# Patient Record
Sex: MALE | Race: WHITE | NOT HISPANIC OR LATINO | Employment: FULL TIME
[De-identification: names, ages, dates, MRNs, and addresses within clinical notes are randomized per-mention and may not be internally consistent; named-entity substitution may affect disease eponyms.]

---

## 2017-08-12 ENCOUNTER — HEALTH MAINTENANCE LETTER (OUTPATIENT)
Age: 57
End: 2017-08-12

## 2017-09-26 ENCOUNTER — OFFICE VISIT (OUTPATIENT)
Dept: FAMILY MEDICINE | Facility: CLINIC | Age: 57
End: 2017-09-26
Payer: COMMERCIAL

## 2017-09-26 VITALS
SYSTOLIC BLOOD PRESSURE: 136 MMHG | HEART RATE: 87 BPM | DIASTOLIC BLOOD PRESSURE: 82 MMHG | HEIGHT: 74 IN | OXYGEN SATURATION: 98 % | WEIGHT: 233 LBS | TEMPERATURE: 98.3 F | BODY MASS INDEX: 29.9 KG/M2

## 2017-09-26 DIAGNOSIS — Z91.09 ENVIRONMENTAL ALLERGIES: ICD-10-CM

## 2017-09-26 DIAGNOSIS — K21.9 GASTROESOPHAGEAL REFLUX DISEASE WITHOUT ESOPHAGITIS: ICD-10-CM

## 2017-09-26 DIAGNOSIS — J01.01 ACUTE RECURRENT MAXILLARY SINUSITIS: Primary | ICD-10-CM

## 2017-09-26 DIAGNOSIS — H93.13 TINNITUS, BILATERAL: ICD-10-CM

## 2017-09-26 PROCEDURE — 99213 OFFICE O/P EST LOW 20 MIN: CPT | Performed by: FAMILY MEDICINE

## 2017-09-26 RX ORDER — OMEPRAZOLE 40 MG/1
40 CAPSULE, DELAYED RELEASE ORAL DAILY
Qty: 90 CAPSULE | Refills: 1 | Status: SHIPPED | OUTPATIENT
Start: 2017-09-26 | End: 2017-12-20

## 2017-09-26 RX ORDER — FLUTICASONE PROPIONATE 50 MCG
1-2 SPRAY, SUSPENSION (ML) NASAL DAILY
Qty: 48 G | Refills: 3 | Status: SHIPPED | OUTPATIENT
Start: 2017-09-26 | End: 2017-12-20

## 2017-09-26 RX ORDER — AZITHROMYCIN 250 MG/1
TABLET, FILM COATED ORAL
Qty: 6 TABLET | Refills: 0 | Status: SHIPPED | OUTPATIENT
Start: 2017-09-26 | End: 2017-12-20

## 2017-09-26 NOTE — MR AVS SNAPSHOT
After Visit Summary   9/26/2017    Kashif Mcdonald    MRN: [id number]           Patient Information     Date Of Birth          1960        Visit Information        Provider Department      9/26/2017 12:40 PM Hong Leon MD [facility]        Today's Diagnoses     Acute recurrent maxillary sinusitis    -  1    Environmental allergies        Gastroesophageal reflux disease without esophagitis        Tinnitus, bilateral          Care Instructions    Refilled omeprazole and Flonase.    Prescribed Zithromax.    Follow up if not improving.       [facility]                        To reach your care team during and after hours:   [phone]  To reach our pharmacy:        [phone]    Clinic Hours                        Our clinic hours are:    Monday   7:30 am to 7:00 pm                  Tuesday through Friday 7:30 am to 5:00 pm                             Saturday   8:00 am to 12:00 pm      Sunday   Closed      Pharmacy Hours                        Our pharmacy hours are:    Monday   8:30 am to 7:00 pm       Tuesday to Friday  8:30 am to 6:00 pm                       Saturday    9:00 am to 1:00 pm              Sunday    Closed              There is also information available at our web site:  [email or web address]    If your provider ordered any lab tests and you do not receive the results within 10 business days, please call the clinic.    If you need a medication refill please contact your pharmacy.  Please allow 2-3 business days for your refill to be completed.    Our clinic offers telephone visits and e visits.  Please ask one of your team members to explain more.      Use NOBLE PEAK VISIONhart (secure email communication and access to your chart) to send your primary care provider a message or make an appointment. Ask someone on your Team how to sign up for NeuroSavet.  Immunizations                      Immunization History   Administered Date(s) Administered     HepB 10/18/1995,  "11/22/1995, 09/24/1996     Influenza (H1N1) 12/28/2009     Influenza (IIV3) 10/14/1999, 11/05/2007, 11/14/2008, 09/11/2009, 02/01/2013, 11/01/2013     Influenza Vaccine, 3 YRS +, IM (QUADRIVALENT W/PRESERVATIVES) 11/28/2014     TD (ADULT, 7+) 04/21/1997, 06/06/2004     Tdap (Adacel,Boostrix) 03/25/2012        Health Maintenance                         Health Maintenance Due   Topic Date Due     Hepatitis C Screening  12/08/1978     Cholesterol Lab - yearly  12/05/2014     Prostate Test (PSA) - yearly  12/05/2014     Colon Cancer Screening - FIT Test - yearly  12/13/2014     Wellness Visit with your Primary Provider - yearly  12/13/2014     Colonoscopy - every 5 years  01/27/2017     Flu Vaccine - yearly  09/01/2017               Follow-ups after your visit        Who to contact     If you have questions or need follow up information about today's clinic visit or your schedule please contact [facility] directly at [phone].  Normal or non-critical lab and imaging results will be communicated to you by Clupediahart, letter or phone within 4 business days after the clinic has received the results. If you do not hear from us within 7 days, please contact the clinic through MobileGlobet or phone. If you have a critical or abnormal lab result, we will notify you by phone as soon as possible.  Submit refill requests through Mobile Theory or call your pharmacy and they will forward the refill request to us. Please allow 3 business days for your refill to be completed.          Additional Information About Your Visit        Mobile Theory Information     Mobile Theory lets you send messages to your doctor, view your test results, renew your prescriptions, schedule appointments and more. To sign up, go to [email or web address] . Click on \"Log in\" on the left side of the screen, which will take you to the Welcome page. Then click on \"Sign up Now\" on the right side of the page.     You will be asked to enter the access code listed " "below, as well as some personal information. Please follow the directions to create your username and password.     Your access code is: [email or web address]  Expires: 2017  1:14 PM     Your access code will  in 90 days. If you need help or a new code, please call your [facility] clinic or [phone].        Care EveryWhere ID     This is your Care EveryWhere ID. This could be used by other organizations to access your [facility] medical records  [id number]        Your Vitals Were     Pulse Temperature Height Pulse Oximetry BMI (Body Mass Index)       87 98.3  F (36.8  C) (Oral) 6' 2\" (1.88 m) 98% 29.92 kg/m2        Blood Pressure from Last 3 Encounters:   17 136/82   16 140/88   16 120/80    Weight from Last 3 Encounters:   17 233 lb (105.7 kg)   16 248 lb (112.5 kg)   16 242 lb 4.8 oz (109.9 kg)              Today, you had the following     No orders found for display         Today's Medication Changes          These changes are accurate as of: 17  1:14 PM.  If you have any questions, ask your nurse or doctor.               Start taking these medicines.        Dose/Directions    azithromycin 250 MG tablet   Commonly known as:  ZITHROMAX   Used for:  Acute recurrent maxillary sinusitis   Started by:  Hong Leon MD        2 tabs day 1 and the 1 tab daily for 4 more days   Quantity:  6 tablet   Refills:  0            Where to get your medicines      These medications were sent to [facility] [address] [address]     Phone:  [phone]     azithromycin 250 MG tablet    fluticasone 50 MCG/ACT spray    omeprazole 40 MG capsule                Primary Care Provider Office Phone # Fax #    Hong Leon [phone]       [address] [place] [address]        Equal Access to Services     [facility] AH: Lashay Carter, val hammeradamartha, qaybta " lisseth elizabeth haychristian bassruchi kwan. [facility] [phone].    ATENCIÓN: Si pita brasher, tiene a myers disposición servicios gratuitos de asistencia lingüística. Stefany al [phone].    We comply with applicable federal civil rights laws and [place] laws. We do not discriminate on the basis of race, color, national origin, age, disability sex, sexual orientation or gender identity.            Thank you!     Thank you for choosing [facility]  for your care. Our goal is always to provide you with excellent care. Hearing back from our patients is one way we can continue to improve our services. Please take a few minutes to complete the written survey that you may receive in the mail after your visit with us. Thank you!             Your Updated Medication List - Protect others around you: Learn how to safely use, store and throw away your medicines at www.disposemymeds.org.          This list is accurate as of: 9/26/17  1:14 PM.  Always use your most recent med list.                   Brand Name Dispense Instructions for use Diagnosis    azithromycin 250 MG tablet    ZITHROMAX    6 tablet    2 tabs day 1 and the 1 tab daily for 4 more days    Acute recurrent maxillary sinusitis       fluticasone 50 MCG/ACT spray    FLONASE    48 g    Spray 1-2 sprays into both nostrils daily    Acute recurrent maxillary sinusitis, Environmental allergies       omeprazole 40 MG capsule    priLOSEC    90 capsule    Take 1 capsule (40 mg) by mouth daily Take 30-60 minutes before a meal.    Gastroesophageal reflux disease without esophagitis

## 2017-09-26 NOTE — NURSING NOTE
"Chief Complaint   Patient presents with     Sinus Problem       Initial /82  Pulse 87  Temp 98.3  F (36.8  C) (Oral)  Ht 6' 2\" (1.88 m)  Wt 233 lb (105.7 kg)  SpO2 98%  BMI 29.92 kg/m2 Estimated body mass index is 29.92 kg/(m^2) as calculated from the following:    Height as of this encounter: 6' 2\" (1.88 m).    Weight as of this encounter: 233 lb (105.7 kg)..  BP completed using cuff size: dionte Gayle MA  "

## 2017-09-26 NOTE — PATIENT INSTRUCTIONS
Refilled omeprazole and Flonase.    Prescribed Zithromax.    Follow up if not improving.       [facility]                        To reach your care team during and after hours:   [phone]  To reach our pharmacy:        [phone]    Clinic Hours                        Our clinic hours are:    Monday   7:30 am to 7:00 pm                  Tuesday through Friday 7:30 am to 5:00 pm                             Saturday   8:00 am to 12:00 pm      Sunday   Closed      Pharmacy Hours                        Our pharmacy hours are:    Monday   8:30 am to 7:00 pm       Tuesday to Friday  8:30 am to 6:00 pm                       Saturday    9:00 am to 1:00 pm              Sunday    Closed              There is also information available at our web site:  [email or web address]    If your provider ordered any lab tests and you do not receive the results within 10 business days, please call the clinic.    If you need a medication refill please contact your pharmacy.  Please allow 2-3 business days for your refill to be completed.    Our clinic offers telephone visits and e visits.  Please ask one of your team members to explain more.      Use [email or web address] (Droppy) (secure email communication and access to your chart) to send your primary care provider a message or make an appointment. Ask someone on your Team how to sign up for [email or web address] (Droppy).  Immunizations                      Immunization History   Administered Date(s) Administered     HepB 10/18/1995, 11/22/1995, 09/24/1996     Influenza (H1N1) 12/28/2009     Influenza (IIV3) 10/14/1999, 11/05/2007, 11/14/2008, 09/11/2009, 02/01/2013, 11/01/2013     Influenza Vaccine, 3 YRS +, IM (QUADRIVALENT W/PRESERVATIVES) 11/28/2014     TD (ADULT, 7+) 04/21/1997, 06/06/2004     Tdap (Adacel,Boostrix) 03/25/2012        Health Maintenance                         Health Maintenance Due   Topic Date Due     Hepatitis C Screening  12/08/1978     Cholesterol Lab - yearly  12/05/2014     Prostate  Test (PSA) - yearly  12/05/2014     Colon Cancer Screening - FIT Test - yearly  12/13/2014     Wellness Visit with your Primary Provider - yearly  12/13/2014     Colonoscopy - every 5 years  01/27/2017     Flu Vaccine - yearly  09/01/2017

## 2017-09-26 NOTE — PROGRESS NOTES
SUBJECTIVE:   Kashif Mcdonald is a 56 year old male who presents to clinic today for the following health issues:    Kashif reports that his symptoms began about 4 days ago. He notes headache - left sided, sinus pressure - maxillary, upper teeth pain, ringing in ears, rhinorrhea with clear mucus, nausea, and post-nasal drip. Has been using saline sinus rinse and ibuprofen.    Kashif denies fever, sore throat, cough, vomiting, and diarrhea.    GERD -- has been using Pepcid Complete, believes omeprazole worked better.     Seasonal allergies -- controlled with Flonase, had a few weeks of symptoms this summer.     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  Additional history: as documented    Reviewed and updated as needed this visit by clinical staff  Tobacco  Allergies  Meds  Med Hx  Surg Hx  Fam Hx  Soc Hx      Reviewed and updated as needed this visit by Provider       BP Readings from Last 3 Encounters:   09/26/17 136/82   03/31/16 140/88   03/18/16 120/80       Wt Readings from Last 4 Encounters:   09/26/17 233 lb (105.7 kg)   03/31/16 248 lb (112.5 kg)   03/18/16 242 lb 4.8 oz (109.9 kg)   01/08/16 244 lb (110.7 kg)       Health Maintenance    Health Maintenance Due   Topic Date Due     HEPATITIS C SCREENING  12/08/1978     LIPID MONITORING Q1 YEAR  12/05/2014     PSA Q1 YR  12/05/2014     FIT Q1 YR  12/13/2014     WELLNESS VISIT Q1 YR  12/13/2014      COLONOSCOPY Q5 YR  01/27/2017     INFLUENZA VACCINE (SYSTEM ASSIGNED)  09/01/2017       Current Problem List    Patient Active Problem List   Diagnosis     Seasonal allergies     GERD (gastroesophageal reflux disease)     CARDIOVASCULAR SCREENING; LDL GOAL LESS THAN 130     Colon polyps     Advanced directives, counseling/discussion       Past Medical History    Past Medical History:   Diagnosis Date     Colocutaneous fistula      Colon polyps 1/12    adenomas x 3     GERD (gastroesophageal reflux disease)      Other specified disorders of eye and adnexa 1971    choroidal rupture - dr jain     Seasonal allergies     spring     Unspecified disorder of thyroid 2005    multinodular goiter - dr herrera/andrisevic       Past Surgical History    Past Surgical History:   Procedure Laterality Date     BIOPSY  2005    goiter - Dr Herrera     COLONOSCOPY  1/12    adenomatous polyps - due 2015     SURGICAL HISTORY OF -   1997    RT SHOULDER REPAIR     SURGICAL HISTORY OF -   1981    WISDOM TEETH       Current Medications    Current Outpatient Prescriptions   Medication Sig Dispense Refill     fluticasone (FLONASE) 50 MCG/ACT spray Spray 1-2 sprays into both nostrils daily 48 g 3     omeprazole (PRILOSEC) 40 MG capsule Take 1 capsule (40 mg) by mouth daily Take 30-60 minutes before a meal. 90 capsule 1     azithromycin (ZITHROMAX) 250 MG tablet 2 tabs day 1 and the 1 tab daily for 4 more days 6 tablet 0       Allergies    No Known Allergies    Immunizations    Immunization History   Administered Date(s) Administered     HepB 10/18/1995, 11/22/1995, 09/24/1996     Influenza (H1N1) 12/28/2009     Influenza (IIV3) 10/14/1999, 11/05/2007, 11/14/2008, 09/11/2009, 02/01/2013, 11/01/2013     Influenza Vaccine, 3 YRS +, IM (QUADRIVALENT W/PRESERVATIVES) 11/28/2014     TD (ADULT, 7+) 04/21/1997, 06/06/2004     Tdap (Adacel,Boostrix) 03/25/2012       Family History    Family History   Problem Relation Age of Onset     CANCER Father   "    age 56 - lymphoma       Social History    Social History     Social History     Marital status:      Spouse name: Esther     Number of children: 2     Years of education: 14     Occupational History      [employer]     Social History Main Topics     Smoking status: Former Smoker     Packs/day: 0.25     Years: 25.00     Types: Cigarettes     Quit date: 7/8/2005     Smokeless tobacco: Never Used     Alcohol use 0.0 - 0.5 oz/week     0 - 1 Standard drinks or equivalent per week      Comment: 0-3 drinks a month     Drug use: No     Sexual activity: Yes     Partners: Female     Birth control/ protection: Condom     Other Topics Concern     Caffeine Concern Yes     2 cups qd     Exercise Yes     work, limited by knees     Seat Belt Yes     Parent/Sibling W/ Cabg, Mi Or Angioplasty Before 65f 55m? No     Social History Narrative       All above reviewed and updated, all stable unless otherwise noted    Recent labs reviewed    ROS:  10 point ROS of systems including Constitutional, Eyes, Respiratory, Cardiovascular, Gastroenterology, Genitourinary, Integumentary, Muscularskeletal, Psychiatric were all negative except for pertinent positives noted in my HPI.    OBJECTIVE:                                                    /82  Pulse 87  Temp 98.3  F (36.8  C) (Oral)  Ht 6' 2\" (1.88 m)  Wt 233 lb (105.7 kg)  SpO2 98%  BMI 29.92 kg/m2  Body mass index is 29.92 kg/(m^2).  GENERAL: healthy, alert and no distress  EYES: Eyes grossly normal to inspection, extraocular movements - intact, and PERRL  HENT: ear canals and TM's normal upon viewing with otoscope, nose and mouth without ulcers or lesions upon viewing with otoscope - mild erythema of throat  RESP: lungs clear to auscultation - no rales, no rhonchi, no wheezes  CV: regular rates and rhythm, normal S1 S2, no S3 or S4 and no murmur, no click or rub -  ABDOMEN: soft, no tenderness, no  hepatosplenomegaly, no masses, normal bowel sounds  MS: extremities- no " gross deformities noted, no edema  SKIN: no suspicious lesions, no rashes  NEURO: mentation intact and speech normal  BACK: no CVA tenderness, no paralumbar tenderness  PSYCH: Alert and oriented times 3; speech- coherent , normal rate and volume; able to articulate logical thoughts, able to abstract reason, no tangential thoughts, no hallucinations or delusions, affect- normal    DIAGNOSTICS/PROCEDURES:                                                      No results found for this or any previous visit (from the past 24 hour(s)).     ASSESSMENT/PLAN:                                                        ICD-10-CM    1. Acute recurrent maxillary sinusitis J01.01 fluticasone (FLONASE) 50 MCG/ACT spray     azithromycin (ZITHROMAX) 250 MG tablet   2. Environmental allergies Z91.09 fluticasone (FLONASE) 50 MCG/ACT spray   3. Gastroesophageal reflux disease without esophagitis K21.9 omeprazole (PRILOSEC) 40 MG capsule   4. Tinnitus, bilateral H93.13      Discussed treatment/modality options, including risk and benefits, he desires follow up for fasting complete physical, further health care maintenance, medication refill(omeprazole, flonase), new medication(Zithromax), OTC meds(ibuprofen) and observation. All diagnosis above reviewed and noted above, otherwise stable.  See [facility] orders for further details.  Follow up as needed.    Refilled omeprazole and flonase.     Prescribed Zithromax.     Follow up if not improving.     OTC ibuprofen as needed.     Sx cares.     Follow up for complete fasting physical.     Health Maintenance Due   Topic Date Due     HEPATITIS C SCREENING  12/08/1978     LIPID MONITORING Q1 YEAR  12/05/2014     PSA Q1 YR  12/05/2014     FIT Q1 YR  12/13/2014     WELLNESS VISIT Q1 YR  12/13/2014     COLONOSCOPY Q5 YR  01/27/2017     INFLUENZA VACCINE (SYSTEM ASSIGNED)  09/01/2017     See Patient Instructions    This document serves as a record of the services and decisions personally performed and  made by [facility] MD [facility]. It was created on their behalf by Sandra Greenwood, a trained medical scribe. The creation of this document is based the provider's statements to the medical scribe. Sandra Greenwood September 26, 2017 12:58 PM              Hong Leon MD [address]  [address] [phone] Fax

## 2017-12-20 ENCOUNTER — OFFICE VISIT (OUTPATIENT)
Dept: FAMILY MEDICINE | Facility: CLINIC | Age: 57
End: 2017-12-20
Payer: COMMERCIAL

## 2017-12-20 VITALS
BODY MASS INDEX: 29.65 KG/M2 | HEART RATE: 80 BPM | TEMPERATURE: 98 F | OXYGEN SATURATION: 98 % | RESPIRATION RATE: 12 BRPM | HEIGHT: 74 IN | DIASTOLIC BLOOD PRESSURE: 74 MMHG | SYSTOLIC BLOOD PRESSURE: 120 MMHG | WEIGHT: 231 LBS

## 2017-12-20 DIAGNOSIS — Z91.09 ENVIRONMENTAL ALLERGIES: ICD-10-CM

## 2017-12-20 DIAGNOSIS — J01.01 ACUTE RECURRENT MAXILLARY SINUSITIS: Primary | ICD-10-CM

## 2017-12-20 DIAGNOSIS — K21.9 GASTROESOPHAGEAL REFLUX DISEASE WITHOUT ESOPHAGITIS: ICD-10-CM

## 2017-12-20 DIAGNOSIS — R21 RASH AND NONSPECIFIC SKIN ERUPTION: ICD-10-CM

## 2017-12-20 LAB
KOH PREP SPEC: NORMAL
SPECIMEN SOURCE: NORMAL

## 2017-12-20 PROCEDURE — 99214 OFFICE O/P EST MOD 30 MIN: CPT | Performed by: PHYSICIAN ASSISTANT

## 2017-12-20 PROCEDURE — 87220 TISSUE EXAM FOR FUNGI: CPT | Performed by: PHYSICIAN ASSISTANT

## 2017-12-20 RX ORDER — OMEPRAZOLE 40 MG/1
40 CAPSULE, DELAYED RELEASE ORAL DAILY
Qty: 90 CAPSULE | Refills: 1 | Status: SHIPPED | OUTPATIENT
Start: 2017-12-20 | End: 2020-04-20

## 2017-12-20 RX ORDER — AZITHROMYCIN 250 MG/1
TABLET, FILM COATED ORAL
Qty: 6 TABLET | Refills: 0 | Status: SHIPPED | OUTPATIENT
Start: 2017-12-20 | End: 2018-02-08

## 2017-12-20 RX ORDER — FLUTICASONE PROPIONATE 50 MCG
1-2 SPRAY, SUSPENSION (ML) NASAL DAILY
Qty: 48 G | Refills: 3 | Status: SHIPPED | OUTPATIENT
Start: 2017-12-20 | End: 2019-08-14

## 2017-12-20 NOTE — MR AVS SNAPSHOT
"              After Visit Summary   2017    Kashif Mcdonald    MRN: [id number]           Patient Information     Date Of Birth          1960        Visit Information        Provider Department      2017 9:20 AM Ngoc Mcgregor PA-C [facility]        Today's Diagnoses     Acute recurrent maxillary sinusitis    -  1    Rash and nonspecific skin eruption        Environmental allergies        Gastroesophageal reflux disease without esophagitis           Follow-ups after your visit        Who to contact     If you have questions or need follow up information about today's clinic visit or your schedule please contact [facility] directly at [phone].  Normal or non-critical lab and imaging results will be communicated to you by MyChart, letter or phone within 4 business days after the clinic has received the results. If you do not hear from us within 7 days, please contact the clinic through FRAMEDhart or phone. If you have a critical or abnormal lab result, we will notify you by phone as soon as possible.  Submit refill requests through CDNlion or call your pharmacy and they will forward the refill request to us. Please allow 3 business days for your refill to be completed.          Additional Information About Your Visit        MyChart Information     CDNlion lets you send messages to your doctor, view your test results, renew your prescriptions, schedule appointments and more. To sign up, go to [email or web address] . Click on \"Log in\" on the left side of the screen, which will take you to the Welcome page. Then click on \"Sign up Now\" on the right side of the page.     You will be asked to enter the access code listed below, as well as some personal information. Please follow the directions to create your username and password.     Your access code is: [email or web address]  Expires: 2017 12:14 PM     Your access code will  in 90 days. If you need help or a new " "code, please call your [facility] clinic or [phone].        Care EveryWhere ID     This is your Care EveryWhere ID. This could be used by other organizations to access your [facility] medical records  [id number]        Your Vitals Were     Pulse Temperature Respirations Height Pulse Oximetry BMI (Body Mass Index)    80 98  F (36.7  C) (Tympanic) 12 6' 2\" (1.88 m) 98% 29.66 kg/m2       Blood Pressure from Last 3 Encounters:   12/20/17 120/74   09/26/17 136/82   03/31/16 140/88    Weight from Last 3 Encounters:   12/20/17 231 lb (104.8 kg)   09/26/17 233 lb (105.7 kg)   03/31/16 248 lb (112.5 kg)              We Performed the Following     KOH prep (skin, hair or nails only)          Today's Medication Changes          These changes are accurate as of: 12/20/17 11:59 PM.  If you have any questions, ask your nurse or doctor.               Start taking these medicines.        Dose/Directions    azithromycin 250 MG tablet   Commonly known as:  ZITHROMAX   Used for:  Acute recurrent maxillary sinusitis   Started by:  Ngoc Mcgregor PA-C        Two tablets first day, then one tablet daily for four days.   Quantity:  6 tablet   Refills:  0            Where to get your medicines      These medications were sent to [facility] [address] [address]     Phone:  [phone]     azithromycin 250 MG tablet    fluticasone 50 MCG/ACT spray    omeprazole 40 MG capsule                Primary Care Provider Office Phone # Fax #    Hong Leon [phone]       [address] [place] [address]        Equal Access to Services     [facility] AH: Lashay Carter, waummda luqadaha, qaybta kaalmada chapis, lisseth kwan. So [facility] [phone].    ATENCIÓN: Si habla español, tiene a myers disposición servicios gratuitos de asistencia lingüística. Llame al [phone].    We comply " with applicable federal civil rights laws and [place] laws. We do not discriminate on the basis of race, color, national origin, age, disability, sex, sexual orientation, or gender identity.            Thank you!     Thank you for choosing [facility]  for your care. Our goal is always to provide you with excellent care. Hearing back from our patients is one way we can continue to improve our services. Please take a few minutes to complete the written survey that you may receive in the mail after your visit with us. Thank you!             Your Updated Medication List - Protect others around you: Learn how to safely use, store and throw away your medicines at www.disposemymeds.org.          This list is accurate as of: 12/20/17 11:59 PM.  Always use your most recent med list.                   Brand Name Dispense Instructions for use Diagnosis    azithromycin 250 MG tablet    ZITHROMAX    6 tablet    Two tablets first day, then one tablet daily for four days.    Acute recurrent maxillary sinusitis       fluticasone 50 MCG/ACT spray    FLONASE    48 g    Spray 1-2 sprays into both nostrils daily    Acute recurrent maxillary sinusitis, Environmental allergies       omeprazole 40 MG capsule    priLOSEC    90 capsule    Take 1 capsule (40 mg) by mouth daily Take 30-60 minutes before a meal.    Gastroesophageal reflux disease without esophagitis

## 2017-12-20 NOTE — PROGRESS NOTES
"  SUBJECTIVE:                                                    Kashif Mcdonald is a 57 year old male who presents to clinic today for the following health issues:      Acute Illness   Acute illness concerns: Sinus Issues. Hx of Seasonal and Environmental  allergies  Onset: x 1 week    Fever: no    Chills/Sweats: no    Headache (location?): Yes    Sinus Pressure:YES    Conjunctivitis:  YES: bilateral    Ear Pain: no    Rhinorrhea: YES    Congestion: YES    Sore Throat: no     Cough: no    Wheeze: no    Decreased Appetite: no    Nausea: no    Vomiting: no    Diarrhea:  no    Dysuria/Freq.: no    Fatigue/Achiness: no    Sick/Strep Exposure: papers in lower lever     Therapies Tried and outcome: Water rinse      Patient reports that last Thursday he was in a room at work that generally \"gets to him\" and now he thinks he has a sinus infection.      Patient reports that he has face pain and a headache.  He also reports increased ringing in his ears.  He also has some pain across his top two teeth.        He does have some nasal congestion.  He is doing the netti pot since last Thursday, but it does not feel like enough.      He has had a minimal cough, but that seems better today.  He denies shortness of breath or wheezing.      He also reports that he has an itchy lesion on his leg that he wants tested for ring worm.  He says it has been present for the past month.      Problem list and histories reviewed & adjusted, as indicated.  Additional history: as documented      ROS:  Constitutional, HEENT, cardiovascular, pulmonary, GI, , musculoskeletal, neuro, skin, endocrine and psych systems are negative, except as otherwise noted.    OBJECTIVE:                                                    /74  Pulse 80  Temp 98  F (36.7  C) (Tympanic)  Resp 12  Ht 6' 2\" (1.88 m)  Wt 231 lb (104.8 kg)  SpO2 98%  BMI 29.66 kg/m2  Body mass index is 29.66 kg/(m^2).  GENERAL: healthy, alert and no distress  EYES: Eyes " "grossly normal to inspection, PERRL and conjunctivae and sclerae normal  HENT: ear canals and TM's normal, nose and mouth without ulcers or lesions  NECK: no adenopathy, no asymmetry, masses, or scars and thyroid normal to palpation  RESP: lungs clear to auscultation - no rales, rhonchi or wheezes  CV: regular rate and rhythm, normal S1 S2, no S3 or S4, no murmur, click or rub, no peripheral edema and peripheral pulses strong  MS: no gross musculoskeletal defects noted, no edema  SKIN: Oval appearing skin rash on anterior left calf that is scaly, no drainage noted, no suspicious lesions or rashes  NEURO: Normal strength and tone, mentation intact and speech normal  PSYCH: mentation appears normal, affect normal/bright    Diagnostic Test Results:  KOH - Negative     ASSESSMENT/PLAN:                                                      Kashif was seen today for sinus problem.    Diagnoses and all orders for this visit:    Acute recurrent maxillary sinusitis  -     fluticasone (FLONASE) 50 MCG/ACT spray; Spray 1-2 sprays into both nostrils daily  -     azithromycin (ZITHROMAX) 250 MG tablet; Two tablets first day, then one tablet daily for four days.    Rash and nonspecific skin eruption  -     KOH prep (skin, hair or nails only)    Environmental allergies  -     fluticasone (FLONASE) 50 MCG/ACT spray; Spray 1-2 sprays into both nostrils daily    Gastroesophageal reflux disease without esophagitis  -     omeprazole (PRILOSEC) 40 MG capsule; Take 1 capsule (40 mg) by mouth daily Take 30-60 minutes before a meal.      - Treatment provided for sinusitis.    - Education given to patient today as it appears that he has francisco treated 4 times in the last year for sinusitis.  Avoiding aggravating triggers and wearing protective wear at work is strongly advised.    - Patient reports that he works in [place] and that we don't understand.  He says that he is in a melissa environment with archived paper.  I advised " that he wear a mask, he reports to having one, but does not want to wear it when the other men do not wear their's.    - Consideration of sinus CT scan or referral to ENT if he develops another sinus infection.      - KOH is negative.  Lesion on leg may be more dry,eczema appearing.  Patient encouraged to try hydrocortisone cream and followup if it is not improving.     - Medication for GERD renewed today as well.      I have discussed the patient's diagnosis, and my plan of treatment with the patient and/or family. Patient is aware to followup if symptoms do not improve.  Patient has been advised to be seen sooner or seek more immediate care if symptoms change or worsen.  Patient agrees with and understands the plan today.   See Patient Instructions        Ngoc Mcgregor PA-C    [facility] [place]

## 2017-12-20 NOTE — NURSING NOTE
"Chief Complaint   Patient presents with     Sinus Problem       Initial /74  Pulse 80  Temp 98  F (36.7  C) (Tympanic)  Resp 12  Ht 6' 2\" (1.88 m)  Wt 231 lb (104.8 kg)  SpO2 98%  BMI 29.66 kg/m2 Estimated body mass index is 29.66 kg/(m^2) as calculated from the following:    Height as of this encounter: 6' 2\" (1.88 m).    Weight as of this encounter: 231 lb (104.8 kg).  Medication Reconciliation: complete   Iris Russell LPN    "

## 2018-02-08 ENCOUNTER — OFFICE VISIT (OUTPATIENT)
Dept: FAMILY MEDICINE | Facility: CLINIC | Age: 58
End: 2018-02-08
Payer: COMMERCIAL

## 2018-02-08 VITALS
TEMPERATURE: 98.1 F | HEIGHT: 74 IN | DIASTOLIC BLOOD PRESSURE: 84 MMHG | OXYGEN SATURATION: 97 % | BODY MASS INDEX: 30.42 KG/M2 | SYSTOLIC BLOOD PRESSURE: 119 MMHG | HEART RATE: 96 BPM | WEIGHT: 237 LBS

## 2018-02-08 DIAGNOSIS — L30.9 DERMATITIS: Primary | ICD-10-CM

## 2018-02-08 PROCEDURE — 99213 OFFICE O/P EST LOW 20 MIN: CPT | Performed by: FAMILY MEDICINE

## 2018-02-08 RX ORDER — HYDROCORTISONE VALERATE 2 MG/G
OINTMENT TOPICAL
Qty: 15 G | Refills: 0 | Status: SHIPPED | OUTPATIENT
Start: 2018-02-08 | End: 2019-07-12

## 2018-02-08 NOTE — NURSING NOTE
"Chief Complaint   Patient presents with     Derm Problem       Initial /84  Pulse 96  Temp 98.1  F (36.7  C) (Oral)  Ht 6' 2\" (1.88 m)  Wt 237 lb (107.5 kg)  SpO2 97%  BMI 30.43 kg/m2 Estimated body mass index is 30.43 kg/(m^2) as calculated from the following:    Height as of this encounter: 6' 2\" (1.88 m).    Weight as of this encounter: 237 lb (107.5 kg).  Medication Reconciliation: complete   Maryellen Centeno Certified Medical Assistant    "

## 2018-02-08 NOTE — PROGRESS NOTES
SUBJECTIVE:   Kashif Mcdonald is a 57 year old male who presents to clinic today for the following health issues:      Rash  Onset: 3 Months     Description:   Location: Left shin   Character: round, blotchy, flakey, painful  Itching (Pruritis): YES- a lot     Progression of Symptoms:  worsening    Accompanying Signs & Symptoms:  Fever: no   Body aches or joint pain: no   Sore throat symptoms: no   Recent cold symptoms: YES- December seen in clinic     History:   Previous similar rash: no - trauma to leg from rotor tiller     Precipitating factors:   Exposure to similar rash: no   New exposures: was in a tunnel and foot got wet with slime, few days before cat scraped that leg    Recent travel: no     Alleviating factors:none     Therapies Tried and outcome: OTC Cortizone and gold bond - not helping       Problem list and histories reviewed & adjusted, as indicated.  Additional history: as documented    Patient Active Problem List   Diagnosis     Seasonal allergies     GERD (gastroesophageal reflux disease)     CARDIOVASCULAR SCREENING; LDL GOAL LESS THAN 130     Colon polyps     Advanced directives, counseling/discussion     Environmental allergies     Past Surgical History:   Pro[facility] Laterality Date     [facility]     COLONOSCOPY  1/12    adenomatous polyps - due 2015     SURGICAL HISTORY OF -   1997    RT SHOULDER REPAIR     SURGICAL HISTORY OF -   1981    WISDOM TEETH       Social History   Substance Use Topics     Smoking status: Former Smoker     Packs/day: 0.25     Years: 25.00     Types: Cigarettes     Quit date: 7/8/2005     Smokeless tobacco: Never Used     Alcohol use 0.0 - 0.5 oz/week     0 - 1 Standard drinks or equivalent per week      Comment: 0-3 drinks a month     Family History   Problem Relation Age of Onset     CANCER Father      age 56 - lymphoma           Reviewed and updated as needed this visit by clinical staff  Tobacco  Allergies  Meds  Problems  Med Hx  Surg  "Hx  Fam Hx  Soc Hx        Reviewed and updated as needed this visit by Provider  Allergies  Meds  Problems         ROS:  Constitutional, HEENT, cardiovascular, pulmonary, gi and gu systems are negative, except as otherwise noted.    OBJECTIVE:     /84  Pulse 96  Temp 98.1  F (36.7  C) (Oral)  Ht 6' 2\" (1.88 m)  Wt 237 lb (107.5 kg)  SpO2 97%  BMI 30.43 kg/m2  Body mass index is 30.43 kg/(m^2).  GENERAL: healthy, alert and no distress  SKIN: scarring over left leg, small erythematous rough patch on anterior left shin    Diagnostic Test Results:  none     ASSESSMENT/PLAN:   1. Dermatitis: unclear etiology -- ?eczema. No improvement with OTC steroid. Will try stronger potency - West-Johny 0.2% ointment TID x 14 days. If rash not improving with this, he will let me know and will send to dermatology for further evaluation.  - hydrocortisone valerate (WEST-JOHNY) 0.2 % ointment; Apply sparingly to affected area three times daily for 14 days.  Dispense: 15 g; Refill: 0    Edward Lucio, DO  [facility] [place]  "

## 2018-02-08 NOTE — MR AVS SNAPSHOT
"              After Visit Summary   2018    Kashif Mcdonald    MRN: [id number]           Patient Information     Date Of Birth          1960        Visit Information        Provider Department      2018 2:40 PM Edward Lucio, DO [facility] [place]        Today's Diagnoses     Dermatitis    -  1       Follow-ups after your visit        Follow-up notes from your care team     Return in about 2 weeks (around 2018), or if symptoms worsen or fail to improve.      Who to contact     If you have questions or need follow up information about today's clinic visit or your schedule please contact [facility] [place] directly at [phone].  Normal or non-critical lab and imaging results will be communicated to you by MyChart, letter or phone within 4 business days after the clinic has received the results. If you do not hear from us within 7 days, please contact the clinic through MyChart or phone. If you have a critical or abnormal lab result, we will notify you by phone as soon as possible.  Submit refill requests through [employer] or call your pharmacy and they will forward the refill request to us. Please allow 3 business days for your refill to be completed.          Additional Information About Your Visit        MyChart Information     [employer] lets you send messages to your doctor, view your test results, renew your prescriptions, schedule appointments and more. To sign up, go to [email or web address] . Click on \"Log in\" on the left side of the screen, which will take you to the Welcome page. Then click on \"Sign up Now\" on the right side of the page.     You will be asked to enter the access code listed below, as well as some personal information. Please follow the directions to create your username and password.     Your access code is: [email or web address]  Expires: 2018  3:40 PM     Your access code will  in 90 days. If you need help or a new code, please call your [facility] or " "[phone].        Care EveryWhere ID     This is your Care EveryWhere ID. This could be used by other organizations to access your [facility] medical records  [id number]        Your Vitals Were     Pulse Temperature Height Pulse Oximetry BMI (Body Mass Index)       96 98.1  F (36.7  C) (Oral) 6' 2\" (1.88 m) 97% 30.43 kg/m2        Blood Pressure from Last 3 Encounters:   02/08/18 119/84   12/20/17 120/74   09/26/17 136/82    Weight from Last 3 Encounters:   02/08/18 237 lb (107.5 kg)   12/20/17 231 lb (104.8 kg)   09/26/17 233 lb (105.7 kg)              Today, you had the following     No orders found for display         Today's Medication Changes          These changes are accurate as of 2/8/18  3:40 PM.  If you have any questions, ask your nurse or doctor.               Start taking these medicines.        Dose/Directions    hydrocortisone valerate 0.2 % ointment   Commonly known as:  WEST-EUGENIO   Used for:  Dermatitis   Started by:  Edward Lucio, DO        Apply sparingly to affected area three times daily for 14 days.   Quantity:  15 g   Refills:  0            Where to get your medicines      These medications were sent to [facility] [place] [address]     Phone:  [phone]     hydrocortisone valerate 0.2 % ointment                Primary Care Provider Office Phone # Fax #    Hong Leon [phone]       [address] [place], [place] [address]        Equal Access to Services     [facility] AH: Hadii aad ku hadasho Soomaali, waaxda luqadaha, qaybta kaalmada lisseth nation. So [facility] [phone].    ATENCIÓN: Si habla español, tiene a myers disposición servicios gratuitos de asistencia lingüística. Llame al [phone].    We comply with applicable federal civil rights laws and [place] laws. We do not discriminate on the basis of race, color, national origin, " age, disability, sex, sexual orientation, or gender identity.            Thank you!     Thank you for choosing [facility]  for your care. Our goal is always to provide you with excellent care. Hearing back from our patients is one way we can continue to improve our services. Please take a few minutes to complete the written survey that you may receive in the mail after your visit with us. Thank you!             Your Updated Medication List - Protect others around you: Learn how to safely use, store and throw away your medicines at www.disposemymeds.org.          This list is accurate as of 2/8/18  3:40 PM.  Always use your most recent med list.                   Brand Name Dispense Instructions for use Diagnosis    fluticasone 50 MCG/ACT spray    FLONASE    48 g    Spray 1-2 sprays into both nostrils daily    Acute recurrent maxillary sinusitis, Environmental allergies       hydrocortisone valerate 0.2 % ointment    WEST-EUGENIO    15 g    Apply sparingly to affected area three times daily for 14 days.    Dermatitis       omeprazole 40 MG capsule    priLOSEC    90 capsule    Take 1 capsule (40 mg) by mouth daily Take 30-60 minutes before a meal.    Gastroesophageal reflux disease without esophagitis

## 2019-07-12 ENCOUNTER — ALLIED HEALTH/NURSE VISIT (OUTPATIENT)
Dept: NURSING | Facility: CLINIC | Age: 59
End: 2019-07-12
Payer: COMMERCIAL

## 2019-07-12 ENCOUNTER — OFFICE VISIT (OUTPATIENT)
Dept: FAMILY MEDICINE | Facility: CLINIC | Age: 59
End: 2019-07-12
Payer: COMMERCIAL

## 2019-07-12 VITALS
WEIGHT: 235.4 LBS | SYSTOLIC BLOOD PRESSURE: 141 MMHG | HEIGHT: 74 IN | RESPIRATION RATE: 12 BRPM | HEART RATE: 77 BPM | DIASTOLIC BLOOD PRESSURE: 85 MMHG | OXYGEN SATURATION: 97 % | BODY MASS INDEX: 30.21 KG/M2 | TEMPERATURE: 97.7 F

## 2019-07-12 VITALS
TEMPERATURE: 97.7 F | RESPIRATION RATE: 12 BRPM | DIASTOLIC BLOOD PRESSURE: 85 MMHG | BODY MASS INDEX: 30.21 KG/M2 | OXYGEN SATURATION: 97 % | SYSTOLIC BLOOD PRESSURE: 141 MMHG | WEIGHT: 235.4 LBS | HEIGHT: 74 IN | HEART RATE: 77 BPM

## 2019-07-12 DIAGNOSIS — R00.0 RACING HEART BEAT: Primary | ICD-10-CM

## 2019-07-12 DIAGNOSIS — F41.9 ANXIETY: Primary | ICD-10-CM

## 2019-07-12 DIAGNOSIS — F41.9 ANXIETY: ICD-10-CM

## 2019-07-12 DIAGNOSIS — F41.0 PANIC ATTACK: ICD-10-CM

## 2019-07-12 DIAGNOSIS — Z51.81 MEDICATION MONITORING ENCOUNTER: ICD-10-CM

## 2019-07-12 LAB
ALBUMIN SERPL-MCNC: 3.9 G/DL (ref 3.4–5)
ALP SERPL-CCNC: 61 U/L (ref 40–150)
ALT SERPL W P-5'-P-CCNC: 68 U/L (ref 0–70)
ANION GAP SERPL CALCULATED.3IONS-SCNC: 5 MMOL/L (ref 3–14)
AST SERPL W P-5'-P-CCNC: 35 U/L (ref 0–45)
BASOPHILS # BLD AUTO: 0.1 10E9/L (ref 0–0.2)
BASOPHILS NFR BLD AUTO: 1.2 %
BILIRUB SERPL-MCNC: 0.5 MG/DL (ref 0.2–1.3)
BUN SERPL-MCNC: 15 MG/DL (ref 7–30)
CALCIUM SERPL-MCNC: 8.6 MG/DL (ref 8.5–10.1)
CHLORIDE SERPL-SCNC: 110 MMOL/L (ref 94–109)
CO2 SERPL-SCNC: 25 MMOL/L (ref 20–32)
CREAT SERPL-MCNC: 0.96 MG/DL (ref 0.66–1.25)
DIFFERENTIAL METHOD BLD: NORMAL
EOSINOPHIL # BLD AUTO: 0.1 10E9/L (ref 0–0.7)
EOSINOPHIL NFR BLD AUTO: 1.5 %
ERYTHROCYTE [DISTWIDTH] IN BLOOD BY AUTOMATED COUNT: 13.6 % (ref 10–15)
GFR SERPL CREATININE-BSD FRML MDRD: 86 ML/MIN/{1.73_M2}
GLUCOSE SERPL-MCNC: 102 MG/DL (ref 70–99)
HCT VFR BLD AUTO: 44.7 % (ref 40–53)
HGB BLD-MCNC: 15.6 G/DL (ref 13.3–17.7)
LYMPHOCYTES # BLD AUTO: 1.5 10E9/L (ref 0.8–5.3)
LYMPHOCYTES NFR BLD AUTO: 23 %
MCH RBC QN AUTO: 29.9 PG (ref 26.5–33)
MCHC RBC AUTO-ENTMCNC: 34.9 G/DL (ref 31.5–36.5)
MCV RBC AUTO: 86 FL (ref 78–100)
MONOCYTES # BLD AUTO: 0.7 10E9/L (ref 0–1.3)
MONOCYTES NFR BLD AUTO: 10.5 %
NEUTROPHILS # BLD AUTO: 4.2 10E9/L (ref 1.6–8.3)
NEUTROPHILS NFR BLD AUTO: 63.8 %
PLATELET # BLD AUTO: 255 10E9/L (ref 150–450)
POTASSIUM SERPL-SCNC: 4.5 MMOL/L (ref 3.4–5.3)
PROT SERPL-MCNC: 7.5 G/DL (ref 6.8–8.8)
RBC # BLD AUTO: 5.22 10E12/L (ref 4.4–5.9)
SODIUM SERPL-SCNC: 140 MMOL/L (ref 133–144)
TSH SERPL DL<=0.005 MIU/L-ACNC: 0.88 MU/L (ref 0.4–4)
WBC # BLD AUTO: 6.6 10E9/L (ref 4–11)

## 2019-07-12 PROCEDURE — 85025 COMPLETE CBC W/AUTO DIFF WBC: CPT | Performed by: FAMILY MEDICINE

## 2019-07-12 PROCEDURE — 99214 OFFICE O/P EST MOD 30 MIN: CPT | Performed by: FAMILY MEDICINE

## 2019-07-12 PROCEDURE — 84443 ASSAY THYROID STIM HORMONE: CPT | Performed by: FAMILY MEDICINE

## 2019-07-12 PROCEDURE — 93000 ELECTROCARDIOGRAM COMPLETE: CPT | Performed by: FAMILY MEDICINE

## 2019-07-12 PROCEDURE — 36415 COLL VENOUS BLD VENIPUNCTURE: CPT | Performed by: FAMILY MEDICINE

## 2019-07-12 PROCEDURE — 80053 COMPREHEN METABOLIC PANEL: CPT | Performed by: FAMILY MEDICINE

## 2019-07-12 ASSESSMENT — MIFFLIN-ST. JEOR
SCORE: 1957.52
SCORE: 1957.52

## 2019-07-12 NOTE — PROGRESS NOTES
SUBJECTIVE:                                                    Kashif Mcdonald is a 58 year old male who presents to clinic today for the following health issues:    Walk In     Patient states he has been having panic attacks over the last couple of months. Patient stated he felt like he was going to faint, heart started to race. Patient dropped down into squat to avoid fainting. Patient stated he was able to get up and walk out and 10 min later he was fine. Patient stated the feeling is completely overwhelming. Patient states most of his attacks have happened at the store and another one has happened in his living room. Patient states he can fell his heartbeat slowly increase and then he becomes lightheaded and an episode occurs. Patient states he has had no issues when exerting himself. Patient states he does not really feel depressed but he does have anxiety. Patient states he has about 3 cups of coffee per day and does not drink soda. Patient feels that his attacks have been more frequent lately. More recently when going into stores     Patient stated he is either having a heart attack or anxiety attack.      Abnormal Mood Symptoms  Onset: gradual over the last 3 months    Description:   Depression: Unsure   Anxiety: YES    Accompanying Signs & Symptoms:  Still participating in activities that you used to enjoy: YES  Fatigue: YES- also very busy at work  Irritability: no   Difficulty concentrating: no   Changes in appetite: no   Problems with sleep: no   Heart racing/beating fast : YES  Thoughts of hurting yourself or others: none    History:   Recent stress: YES- work is very busy  Prior depression hospitalization: None  Family history of depression: YES- daughter has depression/anxiety  Family history of anxiety: YES- son has anxiety/panic attacks    Precipitating factors:   Alcohol/drug use: YES- alcohol rarely    Alleviating factors:  Nothing     Therapies Tried and outcome: None      DENIES: CP, SOB,  Difficulty Breathing, Numbness/Tingling, HA, Hearing Changes, N/V    Reviewed and updated as needed this visit by Provider       BP Readings from Last 3 Encounters:   07/12/19 141/85   07/12/19 141/85   02/08/18 119/84       body mass index is 30.22 kg/m .    Wt Readings from Last 4 Encounters:   07/12/19 106.8 kg (235 lb 6.4 oz)   07/12/19 106.8 kg (235 lb 6.4 oz)   02/08/18 107.5 kg (237 lb)   12/20/17 104.8 kg (231 lb)       Health Maintenance    Health Maintenance Due   Topic Date Due     HEPATITIS C SCREENING  1960     HIV SCREENING  12/08/1975     ZOSTER IMMUNIZATION (1 of 2) 12/08/2010     PREVENTIVE CARE VISIT  12/05/2014     LIPID  12/05/2014     PSA  12/05/2014     FIT  12/13/2014     PHQ-2  01/01/2019       Current Problem List    Patient Active Problem List   Diagnosis     Seasonal allergies     GERD (gastroesophageal reflux disease)     CARDIOVASCULAR SCREENING; LDL GOAL LESS THAN 130     Colon polyps     Advanced directives, counseling/discussion     Environmental allergies       Past Medical History    Past Medical History:   Diagnosis Date     Colocutaneous fistula      Colon polyps 1/12    adenomas x 3     GERD (gastroesophageal reflux disease)      Other specified disorders of eye and adnexa 1971    choroidal rupture - dr jain     Seasonal allergies     spring     Unspecified disorder of thyroid 2005    multinodular goiter - dr herrera/andrisevic       Past Surgical History    Past Surgical History:   Procedure Laterality Date     BIOPSY  2005    goiter - Dr Herrera     COLONOSCOPY  1/12    adenomatous polyps - due 2015     SURGICAL HISTORY OF -   1997    RT SHOULDER REPAIR     SURGICAL HISTORY OF -   1981    WISDOM TEETH       Current Medications    Current Outpatient Medications   Medication Sig Dispense Refill     fluticasone (FLONASE) 50 MCG/ACT spray Spray 1-2 sprays into both nostrils daily 48 g 3     omeprazole (PRILOSEC) 40 MG capsule Take 1 capsule (40 mg) by mouth daily Take  30-60 minutes before a meal. 90 capsule 1       Allergies    No Known Allergies    Immunizations    Immunization History   Administered Date(s) Administered     HepB 10/18/1995, 1995, 1996     Influenza (H1N1) 2009     Influenza (IIV3) PF 10/14/1999, 2007, 2008, 2009, 2013, 2013     Influenza Vaccine, 3 YRS +, IM (QUADRIVALENT W/PRESERVATIVES) 2014     TD (ADULT, 7+) 1997, 2004     Tdap (Adacel,Boostrix) 2012       Family History    Family History   Problem Relation Age of Onset     Cancer Father         age 56 - lymphoma       Social History    Social History     Socioeconomic History     Marital status:      Spouse name: Esther     Number of children: 2     Years of education: 14     Highest education level: Not on file   Occupational History     Employer: [employer]   Social Needs     Financial resource strain: Not on file     Food insecurity:     Worry: Not on file     Inability: Not on file     Transportation needs:     Medical: Not on file     Non-medical: Not on file   Tobacco Use     Smoking status: Former Smoker     Packs/day: 0.25     Years: 25.00     Pack years: 6.25     Types: Cigarettes     Last attempt to quit: 2005     Years since quittin.0     Smokeless tobacco: Never Used   Substance and Sexual Activity     Alcohol use: Yes     Alcohol/week: 0.0 - 0.5 oz     Comment: 0-3 drinks a month     Drug use: No     Sexual activity: Yes     Partners: Female     Birth control/protection: Condom   Lifestyle     Physical activity:     Days per week: Not on file     Minutes per session: Not on file     Stress: Not on file   Relationships     Social connections:     Talks on phone: Not on file     Gets together: Not on file     Attends Sikhism service: Not on file     Active member of club or organization: Not on file     Attends meetings of clubs or organizations: Not on file     Relationship status: Not on file     Intimate  "partner violence:     Fear of current or ex partner: Not on file     Emotionally abused: Not on file     Physically abused: Not on file     Forced sexual activity: Not on file   Other Topics Concern      Service Not Asked     Blood Transfusions Not Asked     Caffeine Concern Yes     Comment: 2 cups qd     Occupational Exposure Not Asked     Hobby Hazards Not Asked     Sleep Concern Not Asked     Stress Concern Not Asked     Weight Concern Not Asked     Special Diet Not Asked     Back Care Not Asked     Exercise Yes     Comment: work, limited by knees     Bike Helmet Not Asked     Seat Belt Yes     Self-Exams Not Asked     Parent/sibling w/ CABG, MI or angioplasty before 65F 55M? No   Social History Narrative     Not on file       All above reviewed and updated, all stable unless otherwise noted    Recent labs reviewed    ROS:  Constitutional, HEENT, cardiovascular, pulmonary, GI, , musculoskeletal, neuro, skin, endocrine and psych systems are negative, except as otherwise noted.    OBJECTIVE:                                                    /85 (BP Location: Left arm, Patient Position: Sitting, Cuff Size: Adult Regular)   Pulse 77   Temp 97.7  F (36.5  C) (Oral)   Resp 12   Ht 1.88 m (6' 2\")   Wt 106.8 kg (235 lb 6.4 oz)   SpO2 97%   BMI 30.22 kg/m    Body mass index is 30.22 kg/m .  GENERAL: healthy, alert and no distress  EYES: Eyes grossly normal to inspection  HENT:ear canals and TM's normal upon viewing with otoscope, nose and mouth without ulcers or lesions upon viewing with otoscope  NECK: no tenderness, no adenopathy, no asymmetry, no masses, no stiffness; thyroid- normal to palpation  RESP: lungs clear to auscultation - no rales, no rhonchi, no wheezes  CV: regular rates and rhythm, normal S1 S2, no S3 or S4 and no murmur, no click or rub -  ABDOMEN: soft, no tenderness, no  hepatosplenomegaly, no masses, normal bowel sounds  MS: extremities- no gross deformities noted, no " edema  SKIN: no suspicious lesions, no rashes  NEURO: strength and tone- normal, sensory exam- grossly normal, mentation- intact, speech- normal, reflexes- symmetric  BACK: no CVA tenderness, no paralumbar tenderness  PSYCH: Alert and oriented times 3; speech- coherent , normal rate and volume; able to articulate logical thoughts, able to abstract reason, no tangential thoughts, no hallucinations or delusions, affect- normal    DIAGNOSTICS/PROCEDURES:                                                      No results found for this or any previous visit (from the past 24 hour(s)).     ASSESSMENT:                                                        ICD-10-CM    1. Racing heart beat R00.0 EKG 12-lead complete w/read - Clinics     Echocardiogram Exercise Stress     Echocardiogram Complete     Cardiac Event Monitor Adult Pediatric     Comprehensive metabolic panel (BMP + Alb, Alk Phos, ALT, AST, Total. Bili, TP)     CBC with platelets and differential     TSH with free T4 reflex     CANCELED: Basic metabolic panel  (Ca, Cl, CO2, Creat, Gluc, K, Na, BUN)   2. Anxiety F41.9    3. Panic attack F41.0    4. Medication monitoring encounter Z51.81      PLAN:                                                    Discussed treatment/modality options, including risk and benefits he desires:    advised 1 multivitamin per day, advised dentist every 6 months, advised diet and exercise and advised opthalmologist every 1-2 years.     1) Patient presented today with palpitations and panic attacks. Stress echo, echocardiogram and 30 day event monitor ordered today for further evaluation of palpitations.     2) Follow up if symptoms persist or worsen.     3) Follow up in 3-4 weeks for recheck visit and follow up post diagnostic tests.    All diagnosis above reviewed and noted above, otherwise stable.  See [facility] orders for further details.     Return in about 1 month (around 8/12/2019).    Health Maintenance Due   Topic Date Due      HEPATITIS C SCREENING  1960     HIV SCREENING  12/08/1975     ZOSTER IMMUNIZATION (1 of 2) 12/08/2010     PREVENTIVE CARE VISIT  12/05/2014     LIPID  12/05/2014     PSA  12/05/2014     FIT  12/13/2014     PHQ-2  01/01/2019     This document serves as a record of the services and decisions personally performed and made by Hong Leon MD. It was created on his behalf by Anibal Peñaloza, a trained medical scribe. The creation of this document is based on the provider's statements to the medical scribe.  Anibal Peñaloza July 12, 2019 8:48 AM     The information in this document, created by the medical scribe for me, accurately reflects the services I personally performed and the decisions made by me. I have reviewed and approved this document for accuracy prior to leaving the patient care area.  July 12, 2019            Hong Leon MD [address]  [address] [phone] Fax

## 2019-07-12 NOTE — PATIENT INSTRUCTIONS
[facility]                        To reach your care team during and after hours:   [phone]  To reach our pharmacy:        [phone]    Clinic Hours                        Our clinic hours are:    Monday   7:30 am to 7:00 pm                  Tuesday through Friday 7:30 am to 5:00 pm                             Saturday   8:00 am to 12:00 pm      Sunday   Closed      Pharmacy Hours                        Our pharmacy hours are:    Monday   8:30 am to 7:00 pm       Tuesday to Friday  8:30 am to 6:00 pm                       Saturday    9:00 am to 1:00 pm              Sunday    Closed              There is also information available at our web site:  [email or web address]    If your provider ordered any lab tests and you do not receive the results within 10 business days, please call the clinic.    If you need a medication refill please contact your pharmacy.  Please allow 2-3 business days for your refill to be completed.    Our clinic offers telephone visits and e visits.  Please ask one of your team members to explain more.      Use [employer] (secure email communication and access to your chart) to send your primary care provider a message or make an appointment. Ask someone on your Team how to sign up for [employer].  Immunizations                      Immunization History   Administered Date(s) Administered     HepB 10/18/1995, 11/22/1995, 09/24/1996     Influenza (H1N1) 12/28/2009     Influenza (IIV3) PF 10/14/1999, 11/05/2007, 11/14/2008, 09/11/2009, 02/01/2013, 11/01/2013     Influenza Vaccine, 3 YRS +, IM (QUADRIVALENT W/PRESERVATIVES) 11/28/2014     TD (ADULT, 7+) 04/21/1997, 06/06/2004     Tdap (Adacel,Boostrix) 03/25/2012        Health Maintenance                         Health Maintenance Due   Topic Date Due     Hepatitis C Screening  1960     One-time HIV Screening  12/08/1975     Zoster (Shingles) Vaccine (1 of 2) 12/08/2010     Preventive Care Visit  12/05/2014      Cholesterol Lab  12/05/2014     Prostate Test  12/05/2014     FIT Test  12/13/2014     PHQ-2  01/01/2019

## 2019-07-12 NOTE — LETTER
[facility]  [address] [place] [id number]                  [phone]   July 15, 2019    Kashif Mcdonald  [address] [facility] [id number]      Dear Kashif,    Here is a summary of your recent test results:    Labs are overall quite good, except borderline glucose.     We advise:     Continue cares as discussed.   Follow up after testing has been completed.     For additional lab test information, [email or web address] is an excellent reference.     Your test results are enclosed.      Please contact me if you have any questions.    In addition, here is a list of due or overdue Health Maintenance reminders.    Health Maintenance Due   Topic Date Due     Hepatitis C Screening  1960     One-time HIV Screening  12/08/1975     Zoster (Shingles) Vaccine (1 of 2) 12/08/2010     Preventive Care Visit  12/05/2014     Cholesterol Lab  12/05/2014     Prostate Test  12/05/2014     FIT Test  12/13/2014     PHQ-2  01/01/2019       Please call us at [phone] (or use [employer]) to address the above recommendations.            Thank you very much for trusting [facility]..     Healthy regards,        Hong Leon M.D.        Results for orders placed or performed in visit on 07/12/19   Comprehensive metabolic panel (BMP + Alb, Alk Phos, ALT, AST, Total. Bili, TP)   Result Value Ref Range    Sodium 140 133 - 144 mmol/L    Potassium 4.5 3.4 - 5.3 mmol/L    Chloride 110 (H) 94 - 109 mmol/L    Carbon Dioxide 25 20 - 32 mmol/L    Anion Gap 5 3 - 14 mmol/L    Glucose 102 (H) 70 - 99 mg/dL    Urea Nitrogen 15 7 - 30 mg/dL    Creatinine 0.96 0.66 - 1.25 mg/dL    GFR Estimate 86 >60 mL/min/[1.73_m2]    GFR Estimate If Black >90 >60 mL/min/[1.73_m2]    Calcium 8.6 8.5 - 10.1 mg/dL    Bilirubin Total 0.5 0.2 - 1.3 mg/dL    Albumin 3.9 3.4 - 5.0 g/dL    Protein Total 7.5 6.8 - 8.8 g/dL    Alkaline Phosphatase 61 40 - 150 U/L    ALT 68 0 - 70 U/L    AST 35 0 -  45 U/L   CBC with platelets and differential   Result Value Ref Range    WBC 6.6 4.0 - 11.0 10e9/L    RBC Count 5.22 4.4 - 5.9 10e12/L    Hemoglobin 15.6 13.3 - 17.7 g/dL    Hematocrit 44.7 40.0 - 53.0 %    MCV 86 78 - 100 fl    MCH 29.9 26.5 - 33.0 pg    MCHC 34.9 31.5 - 36.5 g/dL    RDW 13.6 10.0 - 15.0 %    Platelet Count 255 150 - 450 10e9/L    % Neutrophils 63.8 %    % Lymphocytes 23.0 %    % Monocytes 10.5 %    % Eosinophils 1.5 %    % Basophils 1.2 %    Absolute Neutrophil 4.2 1.6 - 8.3 10e9/L    Absolute Lymphocytes 1.5 0.8 - 5.3 10e9/L    Absolute Monocytes 0.7 0.0 - 1.3 10e9/L    Absolute Eosinophils 0.1 0.0 - 0.7 10e9/L    Absolute Basophils 0.1 0.0 - 0.2 10e9/L    Diff Method Automated Method    TSH with free T4 reflex   Result Value Ref Range    TSH 0.88 0.40 - 4.00 mU/L

## 2019-07-12 NOTE — PROGRESS NOTES
Patient walked in    Stated it has been a gradual symptom over the past couple of months.  Patient stated he felt like he was going to faint, heart started to race. Patient dropped down into squat to avoid fainting. Patient stated he was able to get up and walk out and 10 min later he was fine. Patient stated the feeling is completely overwhelming. Patient stated this has happened a few times.  Other sx: Dizziness/Lightheadedness, slightly blurred vision    Patient stated he is either having a heart attack or anxiety attack.     Abnormal Mood Symptoms  Onset: gradual over the last 3 months    Description:   Depression: Unsure   Anxiety: YES    Accompanying Signs & Symptoms:  Still participating in activities that you used to enjoy: YES  Fatigue: YES- also very busy at work  Irritability: no   Difficulty concentrating: no   Changes in appetite: no   Problems with sleep: no   Heart racing/beating fast : YES  Thoughts of hurting yourself or others: none    History:   Recent stress: YES- work is very busy  Prior depression hospitalization: None  Family history of depression: YES- daughter has depression/anxiety  Family history of anxiety: YES- son has anxiety/panic attacks    Precipitating factors:   Alcohol/drug use: YES- alcohol rarely    Alleviating factors:  Nothing    Therapies Tried and outcome: None     DENIES: CP, SOB, Difficulty Breathing, Numbness/Tingling, HA, Hearing Changes, N/V    Advised OV to R/O cardiac. Moved to MD MELLY schedule    Savanah Barrera RN  [facility]

## 2019-07-19 ENCOUNTER — HOSPITAL ENCOUNTER (OUTPATIENT)
Dept: CARDIOLOGY | Facility: CLINIC | Age: 59
Discharge: HOME | End: 2019-07-19
Attending: FAMILY MEDICINE | Admitting: FAMILY MEDICINE
Payer: COMMERCIAL

## 2019-07-19 DIAGNOSIS — R00.0 RACING HEART BEAT: ICD-10-CM

## 2019-07-19 PROCEDURE — 93350 STRESS TTE ONLY: CPT | Mod: 26 | Performed by: INTERNAL MEDICINE

## 2019-07-19 PROCEDURE — 93325 DOPPLER ECHO COLOR FLOW MAPG: CPT | Mod: TC

## 2019-07-19 PROCEDURE — 93325 DOPPLER ECHO COLOR FLOW MAPG: CPT | Mod: 26 | Performed by: INTERNAL MEDICINE

## 2019-07-19 PROCEDURE — 93321 DOPPLER ECHO F-UP/LMTD STD: CPT | Mod: 26 | Performed by: INTERNAL MEDICINE

## 2019-07-19 PROCEDURE — 93018 CV STRESS TEST I&R ONLY: CPT | Performed by: INTERNAL MEDICINE

## 2019-07-19 PROCEDURE — 93016 CV STRESS TEST SUPVJ ONLY: CPT | Performed by: INTERNAL MEDICINE

## 2019-07-19 PROCEDURE — 93270 REMOTE 30 DAY ECG REV/REPORT: CPT

## 2019-07-19 PROCEDURE — 93272 ECG/REVIEW INTERPRET ONLY: CPT | Performed by: INTERNAL MEDICINE

## 2019-07-31 ENCOUNTER — MYC MEDICAL ADVICE (OUTPATIENT)
Dept: FAMILY MEDICINE | Facility: CLINIC | Age: 59
End: 2019-07-31

## 2019-07-31 DIAGNOSIS — F41.9 ANXIETY: Primary | ICD-10-CM

## 2019-07-31 NOTE — TELEPHONE ENCOUNTER
Patient returning call    Advised of notes below - Patient stated an understanding and agreed with plan.  Referral placed.   Information sent to patient via [employer] per patient request.       Savanah Barrera RN  [facility]

## 2019-07-31 NOTE — TELEPHONE ENCOUNTER
Patient was seen on 07/12/2019 by MD MELLY for racing heart beat.   Had stress test done on 7/19/19 - normal  Holter monitor results still in process.     Routing to PCP for further review/recommendations/orders.      Savanah Barrera RN  [facility]

## 2019-07-31 NOTE — TELEPHONE ENCOUNTER
Attempt # 1    Called #   Telephone Information:   Mobile [phone]       Left a non detailed VM to call back at [phone] and ask for any available Triage Nurse.    Pended Referral      Melvin Henry RN   [facility]

## 2019-08-05 ENCOUNTER — HOSPITAL ENCOUNTER (OUTPATIENT)
Dept: CARDIOLOGY | Facility: CLINIC | Age: 59
Discharge: HOME | End: 2019-08-05
Attending: FAMILY MEDICINE | Admitting: FAMILY MEDICINE
Payer: COMMERCIAL

## 2019-08-05 DIAGNOSIS — R00.0 RACING HEART BEAT: ICD-10-CM

## 2019-08-05 PROCEDURE — 93306 TTE W/DOPPLER COMPLETE: CPT | Mod: 26 | Performed by: INTERNAL MEDICINE

## 2019-08-05 PROCEDURE — 93306 TTE W/DOPPLER COMPLETE: CPT

## 2019-08-05 NOTE — RESULT ENCOUNTER NOTE
Please advise pt that his echo was normal, but due to limitations of the study, small abnormalities could not be excluded.  Overall, reassuring.  It looks like a 30 day event monitor is in place/being worn.  This is likely to more valuable study to evaluate his rapid heart rate.  Further recommendations pending results of the 30 day monitor.

## 2019-08-07 NOTE — PROGRESS NOTES
SUBJECTIVE:                                                    Kashif Mcdonald is a 58 year old male who presents to clinic today for the following health issues:    Palpitations/Anxiety: Patient had a PHQ9 score of 15 and a GAD7 score of 15 today. Patient is following up today for racing heart beat. Patient had stress test and echocardiogram performed and results were normal. Patient still has 30 day event monitor in place. Patient reports he may be experiencing some anxiety. Patient reports having issues going into crowded places (shopping/ restaurants see 7/31/19 [employer] message). Patient has counseling and psychiatry appointments scheduled. Patient states that work is crazy right now due to all the construction around [place]. Patient states 2 days after his dental implant was placed he had a panic attack in the hardware store when a worker came up to him and asked if he could help the patient find anything. Patient states he had another episode while on his computer at home in the living room. Patient states he is starting to be able to tell when an episode is coming on.     Reviewed and updated as needed this visit by Provider       BP Readings from Last 3 Encounters:   08/08/19 132/86   07/12/19 141/85   07/12/19 141/85       body mass index is 30.43 kg/m .    Wt Readings from Last 4 Encounters:   08/08/19 107.5 kg (237 lb)   07/12/19 106.8 kg (235 lb 6.4 oz)   07/12/19 106.8 kg (235 lb 6.4 oz)   02/08/18 107.5 kg (237 lb)       Health Maintenance    Health Maintenance Due   Topic Date Due     HEPATITIS C SCREENING  1960     PHQ-9  1960     HIV SCREENING  12/08/1975     ZOSTER IMMUNIZATION (1 of 2) 12/08/2010     PREVENTIVE CARE VISIT  12/05/2014     LIPID  12/05/2014     PSA  12/05/2014     FIT  12/13/2014       Current Problem List    Patient Active Problem List   Diagnosis     Seasonal allergies     GERD (gastroesophageal reflux disease)     CARDIOVASCULAR SCREENING; LDL GOAL LESS THAN 130      Colon polyps     Advanced directives, counseling/discussion     Environmental allergies     Aortic root dilatation (H)       Past Medical History    Past Medical History:   Diagnosis Date     Aortic root dilatation (H) 08/2019    mild     Colocutaneous fistula      Colon polyps 1/12    adenomas x 3     GERD (gastroesophageal reflux disease)      Other specified disorders of eye and adnexa 1971    choroidal rupture - dr jain     Seasonal allergies     spring     Unspecified disorder of thyroid 2005    multinodular goiter - dr herrera/andrisevic       Past Surgical History    Past Surgical History:   Procedure Laterality Date     BIOPSY  2005    goiter - Dr Herrera     COLONOSCOPY  1/12    adenomatous polyps - due 2015     STRESS ECHO (METRO)  07/2019    normal     SURGICAL HISTORY OF -   1997    RT SHOULDER REPAIR     SURGICAL HISTORY OF -   1981    WISDOM TEETH       Current Medications    Current Outpatient Medications   Medication Sig Dispense Refill     LORazepam (ATIVAN) 0.5 MG tablet Take 1 tablet (0.5 mg) by mouth every 6 hours as needed for anxiety 15 tablet 0     sertraline (ZOLOFT) 50 MG tablet Take 1 tablet (50 mg) by mouth daily 30 tablet 1     fluticasone (FLONASE) 50 MCG/ACT spray Spray 1-2 sprays into both nostrils daily 48 g 3     omeprazole (PRILOSEC) 40 MG capsule Take 1 capsule (40 mg) by mouth daily Take 30-60 minutes before a meal. 90 capsule 1       Allergies    No Known Allergies    Immunizations    Immunization History   Administered Date(s) Administered     HepB 10/18/1995, 11/22/1995, 09/24/1996     Influenza (H1N1) 12/28/2009     Influenza (IIV3) PF 10/14/1999, 11/05/2007, 11/14/2008, 09/11/2009, 02/01/2013, 11/01/2013     Influenza Vaccine, 3 YRS +, IM (QUADRIVALENT W/PRESERVATIVES) 11/28/2014     TD (ADULT, 7+) 04/21/1997, 06/06/2004     Tdap (Adacel,Boostrix) 03/25/2012       Family History    Family History   Problem Relation Age of Onset     Cancer Father         age 56 -  lymphoma       Social History    Social History     Socioeconomic History     Marital status:      Spouse name: Esther     Number of children: 2     Years of education: 14     Highest education level: Not on file   Occupational History     Employer: [employer]   Social Needs     Financial resource strain: Not on file     Food insecurity:     Worry: Not on file     Inability: Not on file     Transportation needs:     Medical: Not on file     Non-medical: Not on file   Tobacco Use     Smoking status: Former Smoker     Packs/day: 0.25     Years: 25.00     Pack years: 6.25     Types: Cigarettes     Last attempt to quit: 2005     Years since quittin.0     Smokeless tobacco: Never Used   Substance and Sexual Activity     Alcohol use: Yes     Alcohol/week: 0.0 - 0.5 oz     Comment: 0-3 drinks a month     Drug use: No     Sexual activity: Yes     Partners: Female     Birth control/protection: Condom   Lifestyle     Physical activity:     Days per week: Not on file     Minutes per session: Not on file     Stress: Not on file   Relationships     Social connections:     Talks on phone: Not on file     Gets together: Not on file     Attends Confucianism service: Not on file     Active member of club or organization: Not on file     Attends meetings of clubs or organizations: Not on file     Relationship status: Not on file     Intimate partner violence:     Fear of current or ex partner: Not on file     Emotionally abused: Not on file     Physically abused: Not on file     Forced sexual activity: Not on file   Other Topics Concern      Service Not Asked     Blood Transfusions Not Asked     Caffeine Concern Yes     Comment: 2 cups qd     Occupational Exposure Not Asked     Hobby Hazards Not Asked     Sleep Concern Not Asked     Stress Concern Not Asked     Weight Concern Not Asked     Special Diet Not Asked     Back Care Not Asked     Exercise Yes     Comment: work, limited by knees     Bike Helmet Not Asked      "Seat Belt Yes     Self-Exams Not Asked     Parent/sibling w/ CABG, MI or angioplasty before 65F 55M? No   Social History Narrative     Not on file       All above reviewed and updated, all stable unless otherwise noted    Recent labs reviewed    ROS:  Constitutional, HEENT, cardiovascular, pulmonary, GI, , musculoskeletal, neuro, skin, endocrine and psych systems are negative, except as otherwise noted.    OBJECTIVE:                                                    /86   Pulse 89   Temp 98.2  F (36.8  C) (Oral)   Ht 1.88 m (6' 2\")   Wt 107.5 kg (237 lb)   SpO2 97%   BMI 30.43 kg/m    Body mass index is 30.43 kg/m .  GENERAL: healthy, alert and no distress  EYES: Eyes grossly normal to inspection  HENT:ear canals and TM's normal upon viewing with otoscope, nose and mouth without ulcers or lesions upon viewing with otoscope  NECK: no tenderness, no adenopathy, no asymmetry, no masses, no stiffness; thyroid- normal to palpation  RESP: lungs clear to auscultation - no rales, no rhonchi, no wheezes  CV: regular rates and rhythm, normal S1 S2, no S3 or S4 and no murmur, no click or rub -  ABDOMEN: soft, no tenderness, no  hepatosplenomegaly, no masses, normal bowel sounds  MS: extremities- no gross deformities noted, no edema  SKIN: no suspicious lesions, no rashes  NEURO: strength and tone- normal, sensory exam- grossly normal, mentation- intact, speech- normal, reflexes- symmetric  BACK: no CVA tenderness, no paralumbar tenderness  PSYCH: Alert and oriented times 3; speech- coherent , normal rate and volume; able to articulate logical thoughts, able to abstract reason, no tangential thoughts, no hallucinations or delusions, affect- normal    DIAGNOSTICS/PROCEDURES:                                                      No results found for this or any previous visit (from the past 24 hour(s)).     ASSESSMENT:                                                        ICD-10-CM    1. Anxiety F41.9 sertraline " (ZOLOFT) 50 MG tablet     LORazepam (ATIVAN) 0.5 MG tablet     MENTAL HEALTH REFERRAL  - Adult; Outpatient Treatment; Individual/Couples/Family/Group Therapy/Health Psychology; [facility] [place] [facility] [phone]; We will contact you to schedule the appointment or please call with any questions   2. Panic attack F41.0 sertraline (ZOLOFT) 50 MG tablet     LORazepam (ATIVAN) 0.5 MG tablet     MENTAL HEALTH REFERRAL  - Adult; Outpatient Treatment; Individual/Couples/Family/Group Therapy/Health Psychology; [facility] [place] [facility] [phone]; We will contact you to schedule the appointment or please call with any questions   3. Aortic root dilatation (H) I77.810    4. Medication monitoring encounter Z51.81        PLAN:                                                    Discussed treatment/modality options, including risk and benefits he desires:    advised 1 multivitamin per day, advised dentist every 6 months, advised diet and exercise and advised opthalmologist every 1-2 years.     Patient presented today with some Anxiety symptoms. Patient had a PHQ9 score of 15 and a GAD7 score of 15 today. Patient prescribed 50 mg Zoloft daily and Ativan as needed for further depression/anxiety management. Patient has psychology and psychiatry appointments scheduled for further depression/anxiety management. Recommend continued follow up.       All diagnosis above reviewed and noted above, otherwise stable.  See [facility] orders for further details.     Return in about 3 weeks (around 8/29/2019), or if symptoms worsen or fail to improve, for Telephone Visit, Medication Recheck Visit.    Health Maintenance Due   Topic Date Due     HEPATITIS C SCREENING  1960     PHQ-9  1960     HIV SCREENING  12/08/1975     ZOSTER IMMUNIZATION (1 of 2) 12/08/2010     PREVENTIVE CARE VISIT  12/05/2014     LIPID  12/05/2014     PSA  12/05/2014     FIT  12/13/2014     This document serves as a record of the services  and decisions personally performed and made by Hong Leon MD. It was created on his behalf by Anibal Peñaloza, a trained medical scribe. The creation of this document is based on the provider's statements to the medical scribe.  Anibal Peñaloza August 8, 2019 10:47 AM     The information in this document, created by the medical scribe for me, accurately reflects the services I personally performed and the decisions made by me. I have reviewed and approved this document for accuracy prior to leaving the patient care area.  August 8, 2019            Hong Leon MD [address]  [id number] [phone] Fax

## 2019-08-08 ENCOUNTER — OFFICE VISIT (OUTPATIENT)
Dept: FAMILY MEDICINE | Facility: CLINIC | Age: 59
End: 2019-08-08
Payer: COMMERCIAL

## 2019-08-08 VITALS
TEMPERATURE: 98.2 F | SYSTOLIC BLOOD PRESSURE: 132 MMHG | WEIGHT: 237 LBS | HEART RATE: 89 BPM | BODY MASS INDEX: 30.42 KG/M2 | HEIGHT: 74 IN | OXYGEN SATURATION: 97 % | DIASTOLIC BLOOD PRESSURE: 86 MMHG

## 2019-08-08 DIAGNOSIS — F41.9 ANXIETY: Primary | ICD-10-CM

## 2019-08-08 DIAGNOSIS — I77.810 AORTIC ROOT DILATATION (H): ICD-10-CM

## 2019-08-08 DIAGNOSIS — Z51.81 MEDICATION MONITORING ENCOUNTER: ICD-10-CM

## 2019-08-08 DIAGNOSIS — F41.0 PANIC ATTACK: ICD-10-CM

## 2019-08-08 PROCEDURE — 99214 OFFICE O/P EST MOD 30 MIN: CPT | Performed by: FAMILY MEDICINE

## 2019-08-08 RX ORDER — LORAZEPAM 0.5 MG/1
0.5 TABLET ORAL EVERY 6 HOURS PRN
Qty: 15 TABLET | Refills: 0 | Status: SHIPPED | OUTPATIENT
Start: 2019-08-08 | End: 2021-03-24

## 2019-08-08 ASSESSMENT — ANXIETY QUESTIONNAIRES
GAD7 TOTAL SCORE: 15
7. FEELING AFRAID AS IF SOMETHING AWFUL MIGHT HAPPEN: MORE THAN HALF THE DAYS
6. BECOMING EASILY ANNOYED OR IRRITABLE: MORE THAN HALF THE DAYS
5. BEING SO RESTLESS THAT IT IS HARD TO SIT STILL: MORE THAN HALF THE DAYS
3. WORRYING TOO MUCH ABOUT DIFFERENT THINGS: MORE THAN HALF THE DAYS
IF YOU CHECKED OFF ANY PROBLEMS ON THIS QUESTIONNAIRE, HOW DIFFICULT HAVE THESE PROBLEMS MADE IT FOR YOU TO DO YOUR WORK, TAKE CARE OF THINGS AT HOME, OR GET ALONG WITH OTHER PEOPLE: VERY DIFFICULT
1. FEELING NERVOUS, ANXIOUS, OR ON EDGE: NEARLY EVERY DAY
2. NOT BEING ABLE TO STOP OR CONTROL WORRYING: MORE THAN HALF THE DAYS

## 2019-08-08 ASSESSMENT — PATIENT HEALTH QUESTIONNAIRE - PHQ9
SUM OF ALL RESPONSES TO PHQ QUESTIONS 1-9: 15
5. POOR APPETITE OR OVEREATING: MORE THAN HALF THE DAYS

## 2019-08-08 ASSESSMENT — MIFFLIN-ST. JEOR: SCORE: 1964.77

## 2019-08-08 NOTE — PATIENT INSTRUCTIONS
[facility]                        To reach your care team during and after hours:   [phone]  To reach our pharmacy:        [phone]    Clinic Hours                        Our clinic hours are:    Monday   7:30 am to 7:00 pm                  Tuesday through Friday 7:30 am to 5:00 pm                             Saturday   8:00 am to 12:00 pm      Sunday   Closed      Pharmacy Hours                        Our pharmacy hours are:    Monday   8:30 am to 7:00 pm       Tuesday to Friday  8:30 am to 6:00 pm                       Saturday    9:00 am to 1:00 pm              Sunday    Closed              There is also information available at our web site:  [email or web address]    If your provider ordered any lab tests and you do not receive the results within 10 business days, please call the clinic.    If you need a medication refill please contact your pharmacy.  Please allow 2-3 business days for your refill to be completed.    Our clinic offers telephone visits and e visits.  Please ask one of your team members to explain more.      Use [employer] (secure email communication and access to your chart) to send your primary care provider a message or make an appointment. Ask someone on your Team how to sign up for [employer].  Immunizations                      Immunization History   Administered Date(s) Administered     HepB 10/18/1995, 11/22/1995, 09/24/1996     Influenza (H1N1) 12/28/2009     Influenza (IIV3) PF 10/14/1999, 11/05/2007, 11/14/2008, 09/11/2009, 02/01/2013, 11/01/2013     Influenza Vaccine, 3 YRS +, IM (QUADRIVALENT W/PRESERVATIVES) 11/28/2014     TD (ADULT, 7+) 04/21/1997, 06/06/2004     Tdap (Adacel,Boostrix) 03/25/2012        Health Maintenance                         Health Maintenance Due   Topic Date Due     Hepatitis C Screening  1960     HIV Screening  12/08/1975     Zoster (Shingles) Vaccine (1 of 2) 12/08/2010     Preventive Care Visit  12/05/2014     Cholesterol Lab   12/05/2014     Prostate Test  12/05/2014     FIT Test  12/13/2014     PHQ-2  01/01/2019

## 2019-08-09 ASSESSMENT — ANXIETY QUESTIONNAIRES: GAD7 TOTAL SCORE: 15

## 2019-08-14 ENCOUNTER — MYC REFILL (OUTPATIENT)
Dept: FAMILY MEDICINE | Facility: CLINIC | Age: 59
End: 2019-08-14

## 2019-08-14 DIAGNOSIS — Z91.09 ENVIRONMENTAL ALLERGIES: ICD-10-CM

## 2019-08-14 DIAGNOSIS — J01.01 ACUTE RECURRENT MAXILLARY SINUSITIS: ICD-10-CM

## 2019-08-15 ENCOUNTER — MYC MEDICAL ADVICE (OUTPATIENT)
Dept: FAMILY MEDICINE | Facility: CLINIC | Age: 59
End: 2019-08-15

## 2019-08-16 RX ORDER — FLUTICASONE PROPIONATE 50 MCG
1-2 SPRAY, SUSPENSION (ML) NASAL DAILY
Qty: 48 G | Refills: 3 | Status: SHIPPED | OUTPATIENT
Start: 2019-08-16 | End: 2020-01-09

## 2019-08-16 NOTE — TELEPHONE ENCOUNTER
"Requested Prescriptions   Pending Prescriptions Disp Refills     fluticasone (FLONASE) 50 MCG/ACT nasal spray 48 g 3     Sig: Spray 1-2 sprays into both nostrils daily       Inhaled Steroids Protocol Passed - 8/14/2019  2:10 PM        Passed - Patient is age 12 or older        Passed - Recent (12 mo) or future (30 days) visit within the authorizing provider's specialty     Patient had office visit in the last 12 months or has a visit in the next 30 days with authorizing provider or within the authorizing provider's specialty.  See \"Patient Info\" tab in inbasket, or \"Choose Columns\" in Meds & Orders section of the refill encounter.              Passed - Medication is active on med list        Prescription approved per [facility].    Melvin Henry RN   [facility]    "

## 2019-08-30 ENCOUNTER — VIRTUAL VISIT (OUTPATIENT)
Dept: FAMILY MEDICINE | Facility: CLINIC | Age: 59
End: 2019-08-30
Payer: COMMERCIAL

## 2019-08-30 DIAGNOSIS — F41.9 ANXIETY: Primary | ICD-10-CM

## 2019-08-30 DIAGNOSIS — F41.0 PANIC ATTACK: ICD-10-CM

## 2019-08-30 DIAGNOSIS — Z51.81 MEDICATION MONITORING ENCOUNTER: ICD-10-CM

## 2019-08-30 PROCEDURE — 99441 ZZC PHYSICIAN TELEPHONE EVALUATION 5-10 MIN: CPT | Performed by: FAMILY MEDICINE

## 2019-08-30 ASSESSMENT — ANXIETY QUESTIONNAIRES
5. BEING SO RESTLESS THAT IT IS HARD TO SIT STILL: NOT AT ALL
2. NOT BEING ABLE TO STOP OR CONTROL WORRYING: NOT AT ALL
7. FEELING AFRAID AS IF SOMETHING AWFUL MIGHT HAPPEN: NOT AT ALL
GAD7 TOTAL SCORE: 2
IF YOU CHECKED OFF ANY PROBLEMS ON THIS QUESTIONNAIRE, HOW DIFFICULT HAVE THESE PROBLEMS MADE IT FOR YOU TO DO YOUR WORK, TAKE CARE OF THINGS AT HOME, OR GET ALONG WITH OTHER PEOPLE: NOT DIFFICULT AT ALL
1. FEELING NERVOUS, ANXIOUS, OR ON EDGE: SEVERAL DAYS
6. BECOMING EASILY ANNOYED OR IRRITABLE: SEVERAL DAYS
3. WORRYING TOO MUCH ABOUT DIFFERENT THINGS: NOT AT ALL

## 2019-08-30 ASSESSMENT — PATIENT HEALTH QUESTIONNAIRE - PHQ9
SUM OF ALL RESPONSES TO PHQ QUESTIONS 1-9: 1
5. POOR APPETITE OR OVEREATING: NOT AT ALL

## 2019-08-30 NOTE — PROGRESS NOTES
TELEPHONE VISIT:                                                      Kashif Mcdonald is a 58 year old male who is being evaluated via a telephone visit.      S: The history as provided by the patient to the provider during this call include     Recently started zoloft daily (only using 1/2 tab)/ativan prn - overall much improved, some sexual side effects, significant stress at work, no ativan use to date, limiting caffeine/alcohol, appt scheduled with counseling in a few weeks, has been able to get in/out stores.    PHQ-9 SCORE 8/8/2019 8/30/2019   PHQ-9 Total Score 15 1       KAYLEY-7 SCORE 8/8/2019 8/30/2019   Total Score 15 2     8/8    Palpitations/Anxiety: Patient had a PHQ9 score of 15 and a GAD7 score of 15 today. Patient is following up today for racing heart beat. Patient had stress test and echocardiogram performed and results were normal. Patient still has 30 day event monitor in place. Patient reports he may be experiencing some anxiety. Patient reports having issues going into crowded places (shopping/ restaurants see 7/31/19 [employer] message). Patient has counseling and psychiatry appointments scheduled. Patient states that work is crazy right now due to all the construction around [place]. Patient states 2 days after his dental implant was placed he had a panic attack in the hardware store when a worker came up to him and asked if he could help the patient find anything. Patient states he had another episode while on his computer at home in the living room. Patient states he is starting to be able to tell when an episode is coming on.     Patient presented today with some Anxiety symptoms. Patient had a PHQ9 score of 15 and a GAD7 score of 15 today. Patient prescribed 50 mg Zoloft daily and Ativan as needed for further depression/anxiety management. Patient has psychology and psychiatry appointments scheduled for further depression/anxiety management. Recommend continued follow up.       Pertinent  "parts of the the patient's medical history reviewed and confirmed by the provider included : chart review     ===================================================    I have reviewed the note as documented above.  This accurately captures the substance of my conversation with the patient      ASSESSMENT:                                                        ICD-10-CM    1. Anxiety F41.9    2. Panic attack F41.0    3. Medication monitoring encounter Z51.81          PLAN:                                                      Discussed treatment/modality options, including risk and benefits, he desires:    Continue same.  Await counseling.  Follow up in 1-2 months.    All diagnosis above reviewed and noted above, otherwise stable.      See [employer] orders for further details.      Health Maintenance Due   Topic Date Due     HEPATITIS C SCREENING  1960     HIV SCREENING  12/08/1975     ZOSTER IMMUNIZATION (1 of 2) 12/08/2010     PREVENTIVE CARE VISIT  12/05/2014     LIPID  12/05/2014     PSA  12/05/2014     FIT  12/13/2014     Total time of call between patient and provider was 5-10 minutes     CONSENT:                                                      \"We have found that certain health care needs can be provided without the need for a physical exam.  This service lets us provide the care you need with a short phone conversation.  If a prescription is necessary we can send it directly to your pharmacy.  If lab work is needed we can place an order for that and you can then stop by our lab to have the test done at a later time.    This telephone visit will be conducted via 3 way call with the you (the patient) , the physician/provider, and a me all on the line at the same time.  This allows your physician/provider to have the phone conversation with you while I will be taking notes for your medical record.  We will have full access to your [place] medical record during this entire phone call.    Since this is like " "an office visit,  will bill your insurance company for this service.  Please check with your medical insurance if this type of telephone/virtual is covered . You may be responsible for the cost of this service if insurance coverage is denied.  The typical cost is $30 (10min), $59(11-20min) and $85 (21-30min)     If during the course of the call the physician/provider feels a telephone visit is not appropriate, you will not be charged for this service\"    Consent has been obtained for this service by care team member: yes.  See the scanned image in the medical record.      The patient has been notified of following (by DEBRA Gayle Medical Assistant            "

## 2019-08-31 ASSESSMENT — ANXIETY QUESTIONNAIRES: GAD7 TOTAL SCORE: 2

## 2019-09-30 ENCOUNTER — HEALTH MAINTENANCE LETTER (OUTPATIENT)
Age: 59
End: 2019-09-30

## 2019-10-08 ENCOUNTER — OFFICE VISIT (OUTPATIENT)
Dept: PSYCHOLOGY | Facility: CLINIC | Age: 59
End: 2019-10-08
Payer: COMMERCIAL

## 2019-10-08 DIAGNOSIS — F41.0 PANIC DISORDER WITHOUT AGORAPHOBIA: Primary | ICD-10-CM

## 2019-10-08 PROCEDURE — 90791 PSYCH DIAGNOSTIC EVALUATION: CPT | Performed by: PSYCHOLOGIST

## 2019-10-08 ASSESSMENT — ANXIETY QUESTIONNAIRES
IF YOU CHECKED OFF ANY PROBLEMS ON THIS QUESTIONNAIRE, HOW DIFFICULT HAVE THESE PROBLEMS MADE IT FOR YOU TO DO YOUR WORK, TAKE CARE OF THINGS AT HOME, OR GET ALONG WITH OTHER PEOPLE: SOMEWHAT DIFFICULT
7. FEELING AFRAID AS IF SOMETHING AWFUL MIGHT HAPPEN: NOT AT ALL
3. WORRYING TOO MUCH ABOUT DIFFERENT THINGS: NOT AT ALL
2. NOT BEING ABLE TO STOP OR CONTROL WORRYING: NOT AT ALL
GAD7 TOTAL SCORE: 2
1. FEELING NERVOUS, ANXIOUS, OR ON EDGE: SEVERAL DAYS
6. BECOMING EASILY ANNOYED OR IRRITABLE: NOT AT ALL
5. BEING SO RESTLESS THAT IT IS HARD TO SIT STILL: SEVERAL DAYS

## 2019-10-08 ASSESSMENT — PATIENT HEALTH QUESTIONNAIRE - PHQ9
5. POOR APPETITE OR OVEREATING: NOT AT ALL
SUM OF ALL RESPONSES TO PHQ QUESTIONS 1-9: 0

## 2019-10-08 ASSESSMENT — COLUMBIA-SUICIDE SEVERITY RATING SCALE - C-SSRS: 1. IN THE PAST MONTH, HAVE YOU WISHED YOU WERE DEAD OR WISHED YOU COULD GO TO SLEEP AND NOT WAKE UP?: NO

## 2019-10-08 NOTE — Clinical Note
Thank you for the referral. I saw the patient and diagnosed him with Panic Disorder without Agoraphobia. We will start by increasing his coping skills to assist with his panic attacks. Please let me know if you have any questions. Ariana Enriquez PsyD LP

## 2019-10-08 NOTE — PROGRESS NOTES
"                                                                                                                                                                      Adult Intake Structured Interview  Standard Diagnostic Assessment      CLIENT'S NAME: Kashif Mcdonald  MRN:   [id number]  :   1960  ACCT. NUMBER: [id number]  DATE OF SERVICE: 10/08/19  VIDEO VISIT: No    Identifying Information:  Client is a 58 year old, ,  male. Client was referred for counseling by Dr. Leon at [facility]. Client is currently employed full time and reports he is able to function appropriately at work. However, at times he has to step out of meetings to decrease his anxiety. Client attended the session alone.       Client's Statement of Presenting Concern:  Client reports the reason for seeking therapy at this time as panic attacks and learn coping skills and decrease anxiety.  Client stated that his symptoms have resulted in the following functional impairments: social interactions      History of Presenting Concern:  Client reports that these problem(s) began in  and 2019. The patient reported that he has noticed a \"few occasions\" over the years of anxiety, but that the panic \"washed over\" him in the summer of . Client has attempted to resolve these concerns in the past through medication. Client reports that other professional(s) are involved in providing support / services.       Social History:  Client reported he grew up in [place], [place]. They were the sixth born of six children. His father passed away in  and his mother has not remarried. Client reported that his childhood was \"good for the most part.\" Client described his current relationships with family of origin as somewhat close. The client reported that he speaks with his mother approximately twice a month and likes his siblings, but does not speak with them often.     Client reported a history of one " marriage. Client has been  for 28 years. Client reported having two children. Client identified some stable and meaningful social connections. Client reported that he has not been involved with the legal system.  Client's highest education level was some college. Client did identify the following learning problems: reading. There are no ethnic, cultural or Caodaism factors that may be relevant for therapy. Client identified his preferred language to be English. Client reported he does not need the assistance of an  or other support involved in therapy. Modifications will not be used to assist communication in therapy. Client did not serve in the .     Client reports family history includes Cancer in his father.    Mental Health History:  Client reported the following biological family members or relatives with mental health issues: Son experienced Anxiety, Daughter experienced Anxiety and Depression and Brother experienced PTSD.  Client previously received the following mental health diagnosis: Panic Disorder.  Client has received the following mental health services in the past: medication(s) from physician / PCP.  Hospitalizations: None.  Client is currently receiving the following services: medication(s) from physician / PCP.      Chemical Health History:  Client reported the following biological family members or relatives with chemical health issues: Brother reportedly used alcohol , cannabis  and heroin . Client has not received chemical dependency treatment in the past. Client is not currently receiving any chemical dependency treatment. Client reports no problems as a result of their drinking / drug use.      Client Reports:  Client reports using alcohol 1 times per month and has 1 beers and mixed drinks at a time. Patient first started drinking at age 16.  Patient reported date of last use was last week.  Patient reports heaviest use was in his early twenties.  Client denies using  tobacco.  Client denies using marijuana.  Client reports using caffeine 2 times per day and drinks 1 at a time. Patient started using caffeine at age 20s.  Client denies using street drugs.  Client denies the non-medical use of prescription or over the counter drugs.    CAGE: None of the patient's responses to the CAGE screening were positive / Negative CAGE score   Based on the negative Cage-Aid score and clinical interview there  are not indications of drug or alcohol abuse.    Discussed the general effects of drugs and alcohol on health and well-being. Therapist gave client printed information about the effects of chemical use on his health and well being.      Significant Losses / Trauma / Abuse / Neglect Issues:  There are indications or report of significant loss, trauma, abuse or neglect issues related to: when he was 12 years old he found another 12 year old who fell off a anthony and was bleeding from his ears..    Issues of possible neglect are not present.      Medical Issues:  Client has had a physical exam to rule out medical causes for current symptoms. Date of last physical exam was greater than a year ago and client was encouraged to schedule an exam with PCP. The client has a [facility] Provider, who is named Hong Leon.. The client reports not having a psychiatrist. Client reports no current medical concerns. The client reports the presence of chronic or episodic pain in the form of wrist pain due to arthritis. The pain level is mild and has a frequency of daily. The client uses CBD oil and tapes his wrists, which helps the pain. There are not significant nutritional concerns. The client reported that he would like to lose the 20 pounds that he gained seven years ago when he quit smoking.     Client reports current meds as:   Current Outpatient Medications   Medication Sig     fluticasone (FLONASE) 50 MCG/ACT nasal spray Spray 1-2 sprays into both nostrils daily     LORazepam (ATIVAN) 0.5  MG tablet Take 1 tablet (0.5 mg) by mouth every 6 hours as needed for anxiety     omeprazole (PRILOSEC) 40 MG capsule Take 1 capsule (40 mg) by mouth daily Take 30-60 minutes before a meal.     sertraline (ZOLOFT) 50 MG tablet Take 1 tablet (50 mg) by mouth daily     No current facility-administered medications for this visit.        Client Allergies:  No Known Allergies  no known allergies to medications    Medical History:  Past Medical History:   Diagnosis Date     Aortic root dilatation (H) 08/2019    mild     Colocutaneous fistula      Colon polyps 1/12    adenomas x 3     GERD (gastroesophageal reflux disease)      Other specified disorders of eye and adnexa 1971    choroidal rupture - dr jain     Seasonal allergies     spring     Unspecified disorder of thyroid 2005    multinodular goiter - dr burris/evie         Medication Adherence:  Client reports taking prescribed medications as prescribed.    Client was provided recommendation to follow-up with prescribing physician.    Mental Status Assessment:  Appearance:   Appropriate   Eye Contact:   Good   Psychomotor Behavior: Normal   Attitude:   Cooperative   Orientation:   All  Speech   Rate / Production: Normal    Volume:  Normal   Mood:    Normal  Affect:    Appropriate   Thought Content:  Clear   Thought Form:  Coherent  Logical   Insight:    Good       Review of Symptoms:  Depression: No symptoms  Meghana:  No symptoms  Psychosis: No symptoms  Anxiety: Worries  Panic:  Palpitations sweating, light headed, fear of dying  Post Traumatic Stress Disorder: No symptoms  Obsessive Compulsive Disorder: No symptoms  Eating Disorder: No symptoms  Oppositional Defiant Disorder: No symptoms  ADD / ADHD: No symptoms  Conduct Disorder: No symptoms      Safety Assessment:    History of Safety Concerns:   Client denied a history of suicidal ideation.    Client denied a history of suicide attempts.    Client denied a history of homicidal ideation.    Client denied a  history of self-injurious ideation and behaviors.    Client denied a history of personal safety concerns.    Client denied a history of assaultive behaviors.        Current Safety Concerns:  Client denies current suicidal ideation.    Client denies current homicidal ideation and behaviors.  Client denies current self-injurious ideation and behaviors.    Client denies current concerns for personal safety.    Client reports the following protective factors: positive relationships positive social network and positive family connections, dedication to family/friends, effective problem-solving skills, sense of meaning, financial stability and enjoys work and co-workers    Client reports there are firearms in the house. The firearms are secured in a locked space.     Plan for Safety and Risk Management:  Recommended that patient call 911 or go to the local ED should there be a change in any of these risk factors.    Client's Strengths and Limitations:  Client identified the following strengths or resources that will help him succeed in counseling: commitment to health and well being, family support and positive work environment. Client identified the following supports: family and friends. Things that may interfere with the client's success in counseling include: chaos at work.      Diagnostic Criteria:  1. Recurrent unexpected panic attacks and meets criteria 2, 3, and 4 (below)  2. At least one of the attacks has been followed by 1 month (or more) of one (or more) of the following:     (a) persistent concern about having additional attacks     (b) worry about the implications of the attack or its consequences     (c) a significant change in behavior related to the attacks  3. Absence of agoraphobia  4. The panic attacks are not to the the direct physiological effects of a substance or general medical condition  5. The panic attacks are not better accounted for by another mental disorder, such as social phobia, specific  phobia, OCD, PTSD, or separation anxiety disorder    - The aformentioned symptoms began 4 month(s) ago and occurs 7 days per week and is experienced as moderate.      Functional Status:  Client's symptoms are causing reduced functional status in the following areas: Occupational / Vocational - needing to take breaks during meetings  Social / Relational - speaking with people in person when feeling panic  completing shopping for items for home      DSM5 Diagnoses: (Sustained by DSM5 Criteria Listed Above)  Diagnoses: 300.01 (F41.0) Panic Disorder  Psychosocial & Contextual Factors: work related stress  WHODAS 2.0 (12 item) = 14            Attendance Agreement:  Client has signed Attendance Agreement:Yes      Collaboration:  Collaboration / coordination with other professionals is not indicated at this time.      Preliminary Treatment Plan:  The client reports no currently identified Orthodoxy, ethnic or cultural issues relevant to therapy.     services are not indicated.    Modifications to assist communication are not indicated.    The concerns identified by the client will be addressed in therapy.    Initial Treatment will focus on: Anxiety - increase coping skills .    As a preliminary treatment goal, client will develop more effective coping skills to manage anxiety symptoms.    The focus of initial interventions will be to alleviate anxiety.    Referral to another professional/service is not indicated at this time..    A Release of Information is not needed at this time.    Report to child / adult protection services was NA.    Patient will have open access to their mental health medical record.    Ariana Enriquez PsyD LP  October 8, 2019

## 2019-10-09 ASSESSMENT — ANXIETY QUESTIONNAIRES: GAD7 TOTAL SCORE: 2

## 2019-10-15 ENCOUNTER — OFFICE VISIT (OUTPATIENT)
Dept: PSYCHOLOGY | Facility: CLINIC | Age: 59
End: 2019-10-15
Payer: COMMERCIAL

## 2019-10-15 DIAGNOSIS — F41.0 PANIC DISORDER WITHOUT AGORAPHOBIA: Primary | ICD-10-CM

## 2019-10-15 PROCEDURE — 90834 PSYTX W PT 45 MINUTES: CPT | Performed by: PSYCHOLOGIST

## 2019-10-15 NOTE — PROGRESS NOTES
"                                           Progress Note    Patient Name: Kashif Mcdonald  Date: 10/15/2019          Service Type: Individual  Video Visit: No     Session Start Time: 1:30PM  Session End Time: 2:15PM     Session Length: 45 minutes    Session #: 2    Attendees: Client attended alone     Treatment Plan Last Reviewed: 10/15/2019   PHQ-9 / KAYLEY-7 : 0/2    DATA  Interactive Complexity: No  Crisis: No       Progress Since Last Session (Related to Symptoms / Goals / Homework):   Symptoms: No change the patient reports avoidance of shopping in person    Homework: Partially completed      Episode of Care Goals: Minimal progress - ACTION (Actively working towards change); Intervened by reinforcing change plan / affirming steps taken     Current / Ongoing Stressors and Concerns:   Stress with Work   Panic attacks and anxiety in public situations      Treatment Objective(s) Addressed in This Session:   Practice deep breathing when shopping and in meetings    The patient reported that he does not know a trigger for his panic attacks, but acknowledged that they often occur while shopping in stores or in meetings for work. He reported that he often feels anxious when lights are buzzing in shopping centers.    The patient identified feeling hot and having a fear of going crazy or losing control when he is shopping.         Intervention:   CBT: socratic questioning, positive reinforcement  Motivational Interviewing: emotion checking   Psychoeducation with the Amygdala and Fight or Flight   Modeling belly breathing or \"breathing like a baby\" in the patient's words     ASSESSMENT: Current Emotional / Mental Status (status of significant symptoms):   Risk status (Self / Other harm or suicidal ideation)   Patient denies current fears or concerns for personal safety.   Patient denies current or recent suicidal ideation or behaviors.   Patientdenies current or recent homicidal ideation or behaviors.   Patient denies current " or recent self injurious behavior or ideation.   Patient denies other safety concerns.   Patient Patient reports there has been no change in risk factors since their last session.     PatientPatient reports there has been no change in protective factors since their last session.     Recommended that patient call 911 or go to the local ED should there be a change in any of these risk factors.     Appearance:   Appropriate    Eye Contact:   Good    Psychomotor Behavior: Normal    Attitude:   Cooperative    Orientation:   All   Speech    Rate / Production: Normal     Volume:  Normal    Mood:    Normal   Affect:    Appropriate    Thought Content:  Clear    Thought Form:  Coherent  Logical    Insight:    Good      Medication Review:   No changes to current psychiatric medication(s)     Medication Compliance:   Yes     Changes in Health Issues:   None reported     Chemical Use Review:   Substance Use: Chemical use reviewed, no active concerns identified      Tobacco Use: No current tobacco use.      Diagnosis:  1. Panic disorder without agoraphobia        Collateral Reports Completed:   Not Applicable    PLAN: (Patient Tasks / Therapist Tasks / Other)  The patient plans to use belly breathing and drink ice water in his meetings. He will also use belly breathing when shopping in public.     Plan to talk about progressive muscle relaxation.    Ariana Enriquez PsyD LP 10/15/2019                                                          ______________________________________________________________________    Treatment Plan    Patient's Name: Kashif Mcdonald  YOB: 1960    Date: 10/15/2019     DSM5 Diagnoses: 300.01 (F41.0) Panic Disorder  Psychosocial / Contextual Factors: work related stress  WHODAS: 14    Referral / Collaboration:  Referral to another professional/service is not indicated at this time.    Anticipated number of session or this episode of care: 20      MeasurableTreatment Goal(s) related to  diagnosis / functional impairment(s)  Goal 1: Patient will decrease avoidance of public places. Current = 2 times a week Goal = 2 times a month.     I will know I've met my goal when I can make it through shopping without getting the urge to duck and run.      Objective #A (Patient Action)    Patient will use cognitive strategies identified in therapy to challenge anxious thoughts.  Status: New - Date: 10/15/2019     Intervention(s)  Therapist will assign homework of weekly exercise of being in public. Patient will use coping skills when feeling anxious. .        Patient has reviewed and agreed to the above plan.      Ariana Enriquez PsyD LP  October 15, 2019

## 2019-11-04 DIAGNOSIS — F41.0 PANIC ATTACK: ICD-10-CM

## 2019-11-04 DIAGNOSIS — F41.9 ANXIETY: ICD-10-CM

## 2019-11-04 NOTE — TELEPHONE ENCOUNTER
"Requested Prescriptions   Pending Prescriptions Disp Refills     sertraline (ZOLOFT) 50 MG tablet [Pharmacy Med Name: SERTRALINE HCL 50MG TABS] 30 tablet 1     Sig: TAKE ONE TABLET BY MOUTH ONCE DAILY       Last Written Prescription Date:  8/8/2019  Last Fill Quantity: 30,  # refills: 1   Last office visit: 8/30/2019 with prescribing provider:     Future Office Visit:   Next 5 appointments (look out 90 days)    Nov 06, 2019 10:00 AM CST  Return Visit with Ariana Ms Zoe [facility] ([facility]) [address]  [facility] [address]  [phone]   Nov 20, 2019 11:00 AM CST  Return Visit with Ariana Ms Zoe Patricia  [facility] ([facility]) [address]  [facility] [address]  [phone]   Dec 03, 2019  2:30 PM CST  Return Visit with Ariana Ms Zoe Patricia  [facility] ([facility]) [address]  [facility] [address]  [phone]               SSRIs Protocol Passed - 11/4/2019  3:07 PM        Passed - Recent (12 mo) or future (30 days) visit within the authorizing provider's specialty     Patient has had an office visit with the authorizing provider or a provider within the authorizing providers department within the previous 12 mos or has a future within next 30 days. See \"Patient Info\" tab in inbasket, or \"Choose Columns\" in Meds & Orders section of the refill encounter.              Passed - Medication is active on med list        Passed - Patient is age 18 or older        "

## 2019-11-06 ENCOUNTER — OFFICE VISIT (OUTPATIENT)
Dept: PSYCHOLOGY | Facility: CLINIC | Age: 59
End: 2019-11-06
Payer: COMMERCIAL

## 2019-11-06 DIAGNOSIS — F41.0 PANIC DISORDER WITHOUT AGORAPHOBIA: Primary | ICD-10-CM

## 2019-11-06 PROCEDURE — 90834 PSYTX W PT 45 MINUTES: CPT | Performed by: PSYCHOLOGIST

## 2019-11-06 NOTE — PROGRESS NOTES
"                                           Progress Note    Patient Name: Kashif Mcdonald  Date: 11/6/2019          Service Type: Individual  Video Visit: No     Session Start Time: 10:00AM  Session End Time: 10:45AM     Session Length: 45 minutes    Session #: 3    Attendees: Client attended alone     Treatment Plan Last Reviewed: 10/15/2019   PHQ-9 / KAYLEY-7 : 0/2    DATA  Interactive Complexity: No  Crisis: No       Progress Since Last Session (Related to Symptoms / Goals / Homework):   Symptoms: Improving for panic    Homework: Achieved / completed to satisfaction      Episode of Care Goals: Satisfactory progress - ACTION (Actively working towards change); Intervened by reinforcing change plan / affirming steps taken     Current / Ongoing Stressors and Concerns:   Stress with Work   Panic attacks and anxiety in public situations      Treatment Objective(s) Addressed in This Session:   practice meditation and progressive muscle relaxation    The patient reported that he avoided going shopping by himself, but went another time with his wife. He denied symptoms of panic while shopping. He reported that his anxiety is about \"not having control\" while having a panic attack in public. He expressed empathy for other people who could hypothetically have a panic attack while in public.     The patient reported that he no longer takes breaks at work since he has stopped smoking. However, he takes a break to eat with peers during his lunch hour. He was encouraged to mediate for 5 minutes mid morning to help with his anxiety at work.          Intervention:   CBT: socratic questioning, positive reinforcement  Motivational Interviewing: emotion checking, emotion naming   Modeled progressive muscle relaxation     ASSESSMENT: Current Emotional / Mental Status (status of significant symptoms):   Risk status (Self / Other harm or suicidal ideation)   Patient denies current fears or concerns for personal safety.   Patient denies " current or recent suicidal ideation or behaviors.   Patientdenies current or recent homicidal ideation or behaviors.   Patient denies current or recent self injurious behavior or ideation.   Patient denies other safety concerns.   Patient Patient reports there has been no change in risk factors since their last session.     PatientPatient reports there has been no change in protective factors since their last session.     Recommended that patient call 911 or go to the local ED should there be a change in any of these risk factors.     Appearance:   Appropriate    Eye Contact:   Good    Psychomotor Behavior: Normal    Attitude:   Cooperative    Orientation:   All   Speech    Rate / Production: Normal     Volume:  Normal    Mood:    Normal   Affect:    Appropriate    Thought Content:  Clear    Thought Form:  Coherent  Logical    Insight:    Good      Medication Review:   No changes to current psychiatric medication(s)     Medication Compliance:   Yes     Changes in Health Issues:   None reported     Chemical Use Review:   Substance Use: Chemical use reviewed, no active concerns identified      Tobacco Use: No current tobacco use.      Diagnosis:  1. Panic disorder without agoraphobia        Collateral Reports Completed:   Not Applicable    PLAN: (Patient Tasks / Therapist Tasks / Other)  The patient plans to meditate mid morning at work and to practice meditation and progressive muscle relaxation before an anxiety provoking situation.      Plan to do ACT Leaves on a Stream. Plan to move to appointments every 4 weeks.     Arinaa Enriquez PsyD LP 11/6/2019                                                           ______________________________________________________________________    Treatment Plan    Patient's Name: Kashif Mcdonald  YOB: 1960    Date: 10/15/2019     DSM5 Diagnoses: 300.01 (F41.0) Panic Disorder  Psychosocial / Contextual Factors: work related stress  WHODAS: 14    Referral /  Collaboration:  Referral to another professional/service is not indicated at this time.    Anticipated number of session or this episode of care: 20      MeasurableTreatment Goal(s) related to diagnosis / functional impairment(s)  Goal 1: Patient will decrease avoidance of public places. Current = 2 times a week Goal = 2 times a month.     I will know I've met my goal when I can make it through shopping without getting the urge to duck and run.      Objective #A (Patient Action)    Patient will use cognitive strategies identified in therapy to challenge anxious thoughts.  Status: New - Date: 10/15/2019     Intervention(s)  Therapist will assign homework of weekly exercise of being in public. Patient will use coping skills when feeling anxious. .        Patient has reviewed and agreed to the above plan.      Ariana Enriquez PsyD LP  October 15, 2019

## 2019-11-06 NOTE — TELEPHONE ENCOUNTER
Routing refill request to provider for review/approval because:  Patient needs to be seen because:  Last Virtual visit:    Instructions         Return in about 1 month (around 9/30/2019), or if symptoms worsen or fail to improve, for Medication Recheck Visit.    After Visit Summary (Automatic SnapShot taken 8/30/2019)

## 2019-12-03 ENCOUNTER — OFFICE VISIT (OUTPATIENT)
Dept: PSYCHOLOGY | Facility: CLINIC | Age: 59
End: 2019-12-03
Payer: COMMERCIAL

## 2019-12-03 ENCOUNTER — ALLIED HEALTH/NURSE VISIT (OUTPATIENT)
Dept: NURSING | Facility: CLINIC | Age: 59
End: 2019-12-03
Payer: COMMERCIAL

## 2019-12-03 DIAGNOSIS — F41.0 PANIC DISORDER WITHOUT AGORAPHOBIA: Primary | ICD-10-CM

## 2019-12-03 DIAGNOSIS — Z23 NEED FOR PROPHYLACTIC VACCINATION AND INOCULATION AGAINST INFLUENZA: Primary | ICD-10-CM

## 2019-12-03 PROCEDURE — 90834 PSYTX W PT 45 MINUTES: CPT | Performed by: PSYCHOLOGIST

## 2019-12-03 PROCEDURE — 90471 IMMUNIZATION ADMIN: CPT

## 2019-12-03 PROCEDURE — 90682 RIV4 VACC RECOMBINANT DNA IM: CPT

## 2019-12-03 ASSESSMENT — ANXIETY QUESTIONNAIRES
2. NOT BEING ABLE TO STOP OR CONTROL WORRYING: NOT AT ALL
IF YOU CHECKED OFF ANY PROBLEMS ON THIS QUESTIONNAIRE, HOW DIFFICULT HAVE THESE PROBLEMS MADE IT FOR YOU TO DO YOUR WORK, TAKE CARE OF THINGS AT HOME, OR GET ALONG WITH OTHER PEOPLE: SOMEWHAT DIFFICULT
3. WORRYING TOO MUCH ABOUT DIFFERENT THINGS: NOT AT ALL
GAD7 TOTAL SCORE: 2
7. FEELING AFRAID AS IF SOMETHING AWFUL MIGHT HAPPEN: NOT AT ALL
1. FEELING NERVOUS, ANXIOUS, OR ON EDGE: SEVERAL DAYS
6. BECOMING EASILY ANNOYED OR IRRITABLE: SEVERAL DAYS
5. BEING SO RESTLESS THAT IT IS HARD TO SIT STILL: NOT AT ALL

## 2019-12-03 ASSESSMENT — PATIENT HEALTH QUESTIONNAIRE - PHQ9
5. POOR APPETITE OR OVEREATING: NOT AT ALL
SUM OF ALL RESPONSES TO PHQ QUESTIONS 1-9: 0

## 2019-12-03 NOTE — PROGRESS NOTES
"                                           Progress Note    Patient Name: Kashif Mcdonald  Date: 12/3/2019          Service Type: Individual  Video Visit: No     Session Start Time: 2:30PM  Session End Time: 3:15M     Session Length: 45 minutes    Session #: 4    Attendees: Client attended alone     Treatment Plan Last Reviewed: 10/15/2019, 12/3/2019    PHQ-9 / KAYLEY-7 : 0/2    DATA  Interactive Complexity: No  Crisis: No       Progress Since Last Session (Related to Symptoms / Goals / Homework):   Symptoms: Improving no panic in the last month    Homework: Achieved / completed to satisfaction      Episode of Care Goals: Satisfactory progress - ACTION (Actively working towards change); Intervened by reinforcing change plan / affirming steps taken     Current / Ongoing Stressors and Concerns:   Stress with Work   Panic attacks and anxiety in public situations      Treatment Objective(s) Addressed in This Session:   identify triggers for irritability    The patient reported that he successfully went to [employer], and other smaller stores while he was shopping with his wife. He reported that he felt calm and \"enjoyed shopping.\" Encouraged patient to shop without his wife to see if he can still be calm and not have a panic attack.     Patient reported being irritable and having a negative tone with others. Discussion about triggers for irritability include anxiety, frustration, incompetence, and feeling like his time is being wasted.      Intervention:   CBT: socratic questioning, positive reinforcement, parallels  Emotion Focused Therapy: emotion checking  Motivational Interviewing: open ended questions        ASSESSMENT: Current Emotional / Mental Status (status of significant symptoms):   Risk status (Self / Other harm or suicidal ideation)   Patient denies current fears or concerns for personal safety.   Patient denies current or recent suicidal ideation or behaviors.   Patientdenies current or recent homicidal " ideation or behaviors.   Patient denies current or recent self injurious behavior or ideation.   Patient denies other safety concerns.   Patient Patient reports there has been no change in risk factors since their last session.     PatientPatient reports there has been no change in protective factors since their last session.     Recommended that patient call 911 or go to the local ED should there be a change in any of these risk factors.     Appearance:   Appropriate    Eye Contact:   Good    Psychomotor Behavior: Normal    Attitude:   Cooperative    Orientation:   All   Speech    Rate / Production: Normal     Volume:  Normal    Mood:    Normal   Affect:    Appropriate    Thought Content:  Clear    Thought Form:  Coherent  Logical    Insight:    Good      Medication Review:   No changes to current psychiatric medication(s)     Medication Compliance:   Yes     Changes in Health Issues:   None reported     Chemical Use Review:   Substance Use: Chemical use reviewed, no active concerns identified      Tobacco Use: No current tobacco use.      Diagnosis:  1. Panic disorder without agoraphobia        Collateral Reports Completed:   Not Applicable    PLAN: (Patient Tasks / Therapist Tasks / Other)  The patient plans to shop without his wife and notice triggers for irritability.       Plan to do [facility]. Plan to continue with appointments every 4 weeks.     Ariana Enriquez PsyD LP 12/3/2019                                                            ______________________________________________________________________    Treatment Plan    Patient's Name: Kashif Mcdonald  YOB: 1960    Date: 10/15/2019     DSM5 Diagnoses: 300.01 (F41.0) Panic Disorder  Psychosocial / Contextual Factors: work related stress  WHODAS: 14    Referral / Collaboration:  Referral to another professional/service is not indicated at this time.    Anticipated number of session or this episode of care:  20      MeasurableTreatment Goal(s) related to diagnosis / functional impairment(s)  Goal 1: Patient will decrease avoidance of public places. Current = 2 times a week Goal = 2 times a month. As of 12/3/2019 no avoidance in the last month. New Goal = no avoidance for two months.     I will know I've met my goal when I can make it through shopping without getting the urge to duck and run.      Objective #A (Patient Action)    Patient will use cognitive strategies identified in therapy to challenge anxious thoughts.  Status: Completed - Date: 12/03/2019  Updated 12/3/2019     Intervention(s)  Therapist will assign homework of weekly exercise of being in public. Patient will use coping skills when feeling anxious. .        Patient has reviewed and agreed to the above plan.      Ariana Enriquez PsyD LP  12/3/2019

## 2019-12-04 ASSESSMENT — ANXIETY QUESTIONNAIRES: GAD7 TOTAL SCORE: 2

## 2020-01-08 ENCOUNTER — OFFICE VISIT (OUTPATIENT)
Dept: PSYCHOLOGY | Facility: CLINIC | Age: 60
End: 2020-01-08
Payer: COMMERCIAL

## 2020-01-08 DIAGNOSIS — F41.0 PANIC DISORDER WITHOUT AGORAPHOBIA: Primary | ICD-10-CM

## 2020-01-08 PROCEDURE — 90834 PSYTX W PT 45 MINUTES: CPT | Performed by: PSYCHOLOGIST

## 2020-01-08 ASSESSMENT — PATIENT HEALTH QUESTIONNAIRE - PHQ9
5. POOR APPETITE OR OVEREATING: NOT AT ALL
SUM OF ALL RESPONSES TO PHQ QUESTIONS 1-9: 1

## 2020-01-08 ASSESSMENT — ANXIETY QUESTIONNAIRES
5. BEING SO RESTLESS THAT IT IS HARD TO SIT STILL: NOT AT ALL
IF YOU CHECKED OFF ANY PROBLEMS ON THIS QUESTIONNAIRE, HOW DIFFICULT HAVE THESE PROBLEMS MADE IT FOR YOU TO DO YOUR WORK, TAKE CARE OF THINGS AT HOME, OR GET ALONG WITH OTHER PEOPLE: NOT DIFFICULT AT ALL
6. BECOMING EASILY ANNOYED OR IRRITABLE: NOT AT ALL
3. WORRYING TOO MUCH ABOUT DIFFERENT THINGS: NOT AT ALL
7. FEELING AFRAID AS IF SOMETHING AWFUL MIGHT HAPPEN: NOT AT ALL
GAD7 TOTAL SCORE: 1
1. FEELING NERVOUS, ANXIOUS, OR ON EDGE: SEVERAL DAYS
2. NOT BEING ABLE TO STOP OR CONTROL WORRYING: NOT AT ALL

## 2020-01-08 NOTE — PROGRESS NOTES
"                                           Progress Note    Patient Name: Kashif Mcdonald  Date: 1/8/2020           Service Type: Individual  Video Visit: No     Session Start Time: 2:35PM  Session End Time: 3:25M     Session Length: 50 minutes    Session #: 5    Attendees: Client attended alone     Treatment Plan Last Reviewed: 10/15/2019, 12/3/2019    PHQ-9 / KAYLEY-7 : 1/1    DATA  Interactive Complexity: No  Crisis: No       Progress Since Last Session (Related to Symptoms / Goals / Homework):   Symptoms: Improving used coping skills when panic started    Homework: Achieved / completed to satisfaction      Episode of Care Goals: Satisfactory progress - ACTION (Actively working towards change); Intervened by reinforcing change plan / affirming steps taken     Current / Ongoing Stressors and Concerns:   Stress with Work   Panic attacks and anxiety in public situations      Treatment Objective(s) Addressed in This Session:   use coping skills at the first signs of anxiety    The patient reported that he had two instances that he noticed signs that panic and anxiety were increasing. He observed shortness of breath, shallow breathing, a racing heartbeat, and feeling uncomfortable. Patient reported that having \"high expectations\" of himself often triggers anxiety. Moreover, he reported that other people in his work environment perceive him to be \"less than\" and a \"glorified .\" He reported some anxiety and frustration over these feelings. The patient reported using distractions, making jokes, and walking around as coping skills that helped to decrease his anxiety from being a \"full blown panic\" attack.      Intervention:   CBT: socratic questioning, positive reinforcement, parallels, thought challenging  Emotion Focused Therapy: emotion checking, emotion naming  Motivational Interviewing: reflections        ASSESSMENT: Current Emotional / Mental Status (status of significant symptoms):   Risk status (Self / Other " harm or suicidal ideation)   Patient denies current fears or concerns for personal safety.   Patient denies current or recent suicidal ideation or behaviors.   Patientdenies current or recent homicidal ideation or behaviors.   Patient denies current or recent self injurious behavior or ideation.   Patient denies other safety concerns.   Patient Patient reports there has been no change in risk factors since their last session.     PatientPatient reports there has been no change in protective factors since their last session.     Recommended that patient call 911 or go to the local ED should there be a change in any of these risk factors.     Appearance:   Appropriate    Eye Contact:   Good    Psychomotor Behavior: Normal    Attitude:   Cooperative    Orientation:   All   Speech    Rate / Production: Normal     Volume:  Normal    Mood:    Normal   Affect:    Appropriate    Thought Content:  Clear    Thought Form:  Coherent  Logical    Insight:    Good      Medication Review:   No changes to current psychiatric medication(s)     Medication Compliance:   Yes     Changes in Health Issues:   None reported     Chemical Use Review:   Substance Use: Chemical use reviewed, no active concerns identified      Tobacco Use: No current tobacco use.      Diagnosis:  1. Panic disorder without agoraphobia        Collateral Reports Completed:   Not Applicable    PLAN: (Patient Tasks / Therapist Tasks / Other)  The patient plans to shop without his wife and to use coping skills when he notices the first signs of anxiety.     Plan to do [facility]. Plan to continue with appointments every 4 weeks.     Ariana Enriquez PsyD LP 1/8/2020                                                           ______________________________________________________________________    Treatment Plan    Patient's Name: Kasihf Mcdonald  YOB: 1960    Date: 10/15/2019     DSM5 Diagnoses: 300.01 (F41.0) Panic Disorder  Psychosocial /  Contextual Factors: work related stress  WHODAS: 14    Referral / Collaboration:  Referral to another professional/service is not indicated at this time.    Anticipated number of session or this episode of care: 20      MeasurableTreatment Goal(s) related to diagnosis / functional impairment(s)  Goal 1: Patient will decrease avoidance of public places. Current = 2 times a week Goal = 2 times a month. As of 12/3/2019 no avoidance in the last month. New Goal = no avoidance for two months.     I will know I've met my goal when I can make it through shopping without getting the urge to duck and run.      Objective #A (Patient Action)    Patient will use cognitive strategies identified in therapy to challenge anxious thoughts.  Status: Completed - Date: 12/03/2019  Updated 12/3/2019     Intervention(s)  Therapist will assign homework of weekly exercise of being in public. Patient will use coping skills when feeling anxious. .        Patient has reviewed and agreed to the above plan.      Ariana Enriquez PsyD LP  12/3/2019

## 2020-01-09 ENCOUNTER — OFFICE VISIT (OUTPATIENT)
Dept: FAMILY MEDICINE | Facility: CLINIC | Age: 60
End: 2020-01-09
Payer: COMMERCIAL

## 2020-01-09 VITALS
OXYGEN SATURATION: 98 % | DIASTOLIC BLOOD PRESSURE: 78 MMHG | BODY MASS INDEX: 31.44 KG/M2 | WEIGHT: 245 LBS | TEMPERATURE: 98.6 F | HEART RATE: 86 BPM | SYSTOLIC BLOOD PRESSURE: 124 MMHG | HEIGHT: 74 IN

## 2020-01-09 DIAGNOSIS — Z12.11 SCREEN FOR COLON CANCER: ICD-10-CM

## 2020-01-09 DIAGNOSIS — Z12.5 SCREENING FOR PROSTATE CANCER: ICD-10-CM

## 2020-01-09 DIAGNOSIS — Z13.6 CARDIOVASCULAR SCREENING; LDL GOAL LESS THAN 130: ICD-10-CM

## 2020-01-09 DIAGNOSIS — I77.810 AORTIC ROOT DILATATION (H): ICD-10-CM

## 2020-01-09 DIAGNOSIS — R00.0 RACING HEART BEAT: ICD-10-CM

## 2020-01-09 DIAGNOSIS — F41.9 ANXIETY: ICD-10-CM

## 2020-01-09 DIAGNOSIS — K63.5 POLYP OF COLON, UNSPECIFIED PART OF COLON, UNSPECIFIED TYPE: ICD-10-CM

## 2020-01-09 DIAGNOSIS — Z91.09 ENVIRONMENTAL ALLERGIES: ICD-10-CM

## 2020-01-09 DIAGNOSIS — Z23 NEED FOR HEPATITIS A VACCINATION: ICD-10-CM

## 2020-01-09 DIAGNOSIS — K21.9 GASTROESOPHAGEAL REFLUX DISEASE WITHOUT ESOPHAGITIS: ICD-10-CM

## 2020-01-09 DIAGNOSIS — Z51.81 MEDICATION MONITORING ENCOUNTER: ICD-10-CM

## 2020-01-09 DIAGNOSIS — Z00.00 ENCOUNTER FOR ROUTINE ADULT HEALTH EXAMINATION WITHOUT ABNORMAL FINDINGS: Primary | ICD-10-CM

## 2020-01-09 DIAGNOSIS — E66.811 CLASS 1 OBESITY WITH SERIOUS COMORBIDITY AND BODY MASS INDEX (BMI) OF 31.0 TO 31.9 IN ADULT, UNSPECIFIED OBESITY TYPE: ICD-10-CM

## 2020-01-09 DIAGNOSIS — F41.0 PANIC ATTACK: ICD-10-CM

## 2020-01-09 LAB
ALBUMIN SERPL-MCNC: 3.7 G/DL (ref 3.4–5)
ALBUMIN UR-MCNC: NEGATIVE MG/DL
ALP SERPL-CCNC: 57 U/L (ref 40–150)
ALT SERPL W P-5'-P-CCNC: 57 U/L (ref 0–70)
ANION GAP SERPL CALCULATED.3IONS-SCNC: 4 MMOL/L (ref 3–14)
APPEARANCE UR: CLEAR
AST SERPL W P-5'-P-CCNC: 27 U/L (ref 0–45)
BILIRUB SERPL-MCNC: 0.3 MG/DL (ref 0.2–1.3)
BILIRUB UR QL STRIP: NEGATIVE
BUN SERPL-MCNC: 23 MG/DL (ref 7–30)
CALCIUM SERPL-MCNC: 9.3 MG/DL (ref 8.5–10.1)
CHLORIDE SERPL-SCNC: 108 MMOL/L (ref 94–109)
CHOLEST SERPL-MCNC: 207 MG/DL
CK SERPL-CCNC: 458 U/L (ref 30–300)
CO2 SERPL-SCNC: 28 MMOL/L (ref 20–32)
COLOR UR AUTO: YELLOW
CREAT SERPL-MCNC: 0.95 MG/DL (ref 0.66–1.25)
CREAT UR-MCNC: 94 MG/DL
ERYTHROCYTE [DISTWIDTH] IN BLOOD BY AUTOMATED COUNT: 14.2 % (ref 10–15)
GFR SERPL CREATININE-BSD FRML MDRD: 87 ML/MIN/{1.73_M2}
GLUCOSE SERPL-MCNC: 108 MG/DL (ref 70–99)
GLUCOSE UR STRIP-MCNC: NEGATIVE MG/DL
HCT VFR BLD AUTO: 47.4 % (ref 40–53)
HDLC SERPL-MCNC: 60 MG/DL
HGB BLD-MCNC: 15.7 G/DL (ref 13.3–17.7)
HGB UR QL STRIP: ABNORMAL
KETONES UR STRIP-MCNC: NEGATIVE MG/DL
LDLC SERPL CALC-MCNC: 129 MG/DL
LEUKOCYTE ESTERASE UR QL STRIP: NEGATIVE
MCH RBC QN AUTO: 28.5 PG (ref 26.5–33)
MCHC RBC AUTO-ENTMCNC: 33.1 G/DL (ref 31.5–36.5)
MCV RBC AUTO: 86 FL (ref 78–100)
MICROALBUMIN UR-MCNC: <5 MG/L
MICROALBUMIN/CREAT UR: NORMAL MG/G CR (ref 0–17)
NITRATE UR QL: NEGATIVE
NON-SQ EPI CELLS #/AREA URNS LPF: NORMAL /LPF
NONHDLC SERPL-MCNC: 147 MG/DL
PH UR STRIP: 5.5 PH (ref 5–7)
PLATELET # BLD AUTO: 240 10E9/L (ref 150–450)
POTASSIUM SERPL-SCNC: 4.6 MMOL/L (ref 3.4–5.3)
PROT SERPL-MCNC: 7.5 G/DL (ref 6.8–8.8)
PSA SERPL-ACNC: 0.53 UG/L (ref 0–4)
RBC # BLD AUTO: 5.51 10E12/L (ref 4.4–5.9)
RBC #/AREA URNS AUTO: NORMAL /HPF
SODIUM SERPL-SCNC: 140 MMOL/L (ref 133–144)
SOURCE: ABNORMAL
SP GR UR STRIP: 1.02 (ref 1–1.03)
TRIGL SERPL-MCNC: 88 MG/DL
TSH SERPL DL<=0.005 MIU/L-ACNC: 1.89 MU/L (ref 0.4–4)
UROBILINOGEN UR STRIP-ACNC: 0.2 EU/DL (ref 0.2–1)
WBC # BLD AUTO: 7 10E9/L (ref 4–11)
WBC #/AREA URNS AUTO: NORMAL /HPF

## 2020-01-09 PROCEDURE — 36415 COLL VENOUS BLD VENIPUNCTURE: CPT | Performed by: FAMILY MEDICINE

## 2020-01-09 PROCEDURE — 82550 ASSAY OF CK (CPK): CPT | Performed by: FAMILY MEDICINE

## 2020-01-09 PROCEDURE — 90471 IMMUNIZATION ADMIN: CPT | Performed by: FAMILY MEDICINE

## 2020-01-09 PROCEDURE — 81001 URINALYSIS AUTO W/SCOPE: CPT | Performed by: FAMILY MEDICINE

## 2020-01-09 PROCEDURE — 90632 HEPA VACCINE ADULT IM: CPT | Performed by: FAMILY MEDICINE

## 2020-01-09 PROCEDURE — 84443 ASSAY THYROID STIM HORMONE: CPT | Performed by: FAMILY MEDICINE

## 2020-01-09 PROCEDURE — 85027 COMPLETE CBC AUTOMATED: CPT | Performed by: FAMILY MEDICINE

## 2020-01-09 PROCEDURE — 82043 UR ALBUMIN QUANTITATIVE: CPT | Performed by: FAMILY MEDICINE

## 2020-01-09 PROCEDURE — 80061 LIPID PANEL: CPT | Performed by: FAMILY MEDICINE

## 2020-01-09 PROCEDURE — G0103 PSA SCREENING: HCPCS | Performed by: FAMILY MEDICINE

## 2020-01-09 PROCEDURE — 99396 PREV VISIT EST AGE 40-64: CPT | Mod: 25 | Performed by: FAMILY MEDICINE

## 2020-01-09 PROCEDURE — 82306 VITAMIN D 25 HYDROXY: CPT | Performed by: FAMILY MEDICINE

## 2020-01-09 PROCEDURE — 99213 OFFICE O/P EST LOW 20 MIN: CPT | Mod: 25 | Performed by: FAMILY MEDICINE

## 2020-01-09 PROCEDURE — 80053 COMPREHEN METABOLIC PANEL: CPT | Performed by: FAMILY MEDICINE

## 2020-01-09 RX ORDER — FLUTICASONE PROPIONATE 50 MCG
1-2 SPRAY, SUSPENSION (ML) NASAL DAILY
Qty: 54.6 ML | Refills: 3 | Status: SHIPPED | OUTPATIENT
Start: 2020-01-09 | End: 2020-04-20

## 2020-01-09 ASSESSMENT — ANXIETY QUESTIONNAIRES
5. BEING SO RESTLESS THAT IT IS HARD TO SIT STILL: NOT AT ALL
1. FEELING NERVOUS, ANXIOUS, OR ON EDGE: NOT AT ALL
GAD7 TOTAL SCORE: 1
3. WORRYING TOO MUCH ABOUT DIFFERENT THINGS: NOT AT ALL
6. BECOMING EASILY ANNOYED OR IRRITABLE: NOT AT ALL
7. FEELING AFRAID AS IF SOMETHING AWFUL MIGHT HAPPEN: NOT AT ALL
GAD7 TOTAL SCORE: 0
2. NOT BEING ABLE TO STOP OR CONTROL WORRYING: NOT AT ALL

## 2020-01-09 ASSESSMENT — PATIENT HEALTH QUESTIONNAIRE - PHQ9
SUM OF ALL RESPONSES TO PHQ QUESTIONS 1-9: 0
5. POOR APPETITE OR OVEREATING: NOT AT ALL

## 2020-01-09 ASSESSMENT — MIFFLIN-ST. JEOR: SCORE: 1996.06
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
## 2020-01-10 LAB — DEPRECATED CALCIDIOL+CALCIFEROL SERPL-MC: 32 UG/L (ref 20–75)

## 2020-01-10 ASSESSMENT — ANXIETY QUESTIONNAIRES: GAD7 TOTAL SCORE: 0

## 2020-01-13 ENCOUNTER — TELEPHONE (OUTPATIENT)
Dept: FAMILY MEDICINE | Facility: CLINIC | Age: 60
End: 2020-01-13

## 2020-01-13 DIAGNOSIS — R74.8 ELEVATED CK: Primary | ICD-10-CM

## 2020-01-13 DIAGNOSIS — R73.09 ELEVATED GLUCOSE: ICD-10-CM

## 2020-01-21 DIAGNOSIS — Z00.00 ENCOUNTER FOR ROUTINE ADULT HEALTH EXAMINATION WITHOUT ABNORMAL FINDINGS: ICD-10-CM

## 2020-01-21 DIAGNOSIS — K63.5 POLYP OF COLON, UNSPECIFIED PART OF COLON, UNSPECIFIED TYPE: ICD-10-CM

## 2020-01-21 DIAGNOSIS — Z12.11 SCREEN FOR COLON CANCER: ICD-10-CM

## 2020-01-21 PROCEDURE — 82274 ASSAY TEST FOR BLOOD FECAL: CPT | Performed by: FAMILY MEDICINE

## 2020-01-24 DIAGNOSIS — R73.09 ELEVATED GLUCOSE: ICD-10-CM

## 2020-01-24 DIAGNOSIS — R74.8 ELEVATED CK: ICD-10-CM

## 2020-01-24 LAB
CK SERPL-CCNC: 602 U/L (ref 30–300)
GLUCOSE SERPL-MCNC: 114 MG/DL (ref 70–99)
HBA1C MFR BLD: 5.7 % (ref 0–5.6)
HEMOCCULT STL QL IA: NEGATIVE

## 2020-01-24 PROCEDURE — 36415 COLL VENOUS BLD VENIPUNCTURE: CPT | Performed by: FAMILY MEDICINE

## 2020-01-24 PROCEDURE — 82550 ASSAY OF CK (CPK): CPT | Performed by: FAMILY MEDICINE

## 2020-01-24 PROCEDURE — 83036 HEMOGLOBIN GLYCOSYLATED A1C: CPT | Performed by: FAMILY MEDICINE

## 2020-01-24 PROCEDURE — 82947 ASSAY GLUCOSE BLOOD QUANT: CPT | Performed by: FAMILY MEDICINE

## 2020-01-27 DIAGNOSIS — R73.03 PREDIABETES: ICD-10-CM

## 2020-01-27 DIAGNOSIS — R74.8 ELEVATED CK: Primary | ICD-10-CM

## 2020-02-04 ENCOUNTER — HOSPITAL ENCOUNTER (OUTPATIENT)
Facility: CLINIC | Age: 60
Discharge: HOME | End: 2020-02-04
Attending: INTERNAL MEDICINE | Admitting: INTERNAL MEDICINE
Payer: COMMERCIAL

## 2020-02-04 VITALS
WEIGHT: 240 LBS | SYSTOLIC BLOOD PRESSURE: 102 MMHG | OXYGEN SATURATION: 95 % | RESPIRATION RATE: 16 BRPM | HEART RATE: 79 BPM | HEIGHT: 74 IN | BODY MASS INDEX: 30.8 KG/M2 | DIASTOLIC BLOOD PRESSURE: 77 MMHG

## 2020-02-04 LAB — COLONOSCOPY: NORMAL

## 2020-02-04 PROCEDURE — G0500 MOD SEDAT ENDO SERVICE >5YRS: HCPCS | Performed by: INTERNAL MEDICINE

## 2020-02-04 PROCEDURE — 25000128 H RX IP 250 OP 636: Performed by: INTERNAL MEDICINE

## 2020-02-04 PROCEDURE — 25800030 ZZH RX IP 258 OP 636: Performed by: INTERNAL MEDICINE

## 2020-02-04 PROCEDURE — 45378 DIAGNOSTIC COLONOSCOPY: CPT | Performed by: INTERNAL MEDICINE

## 2020-02-04 PROCEDURE — G0105 COLORECTAL SCRN; HI RISK IND: HCPCS | Performed by: INTERNAL MEDICINE

## 2020-02-04 RX ORDER — ONDANSETRON 4 MG/1
4 TABLET, ORALLY DISINTEGRATING ORAL EVERY 6 HOURS PRN
Status: DISCONTINUED | OUTPATIENT
Start: 2020-02-04 | End: 2020-02-04 | Stop reason: HOSPADM

## 2020-02-04 RX ORDER — ONDANSETRON 2 MG/ML
4 INJECTION INTRAMUSCULAR; INTRAVENOUS
Status: DISCONTINUED | OUTPATIENT
Start: 2020-02-04 | End: 2020-02-04 | Stop reason: HOSPADM

## 2020-02-04 RX ORDER — FLUMAZENIL 0.1 MG/ML
0.2 INJECTION, SOLUTION INTRAVENOUS
Status: DISCONTINUED | OUTPATIENT
Start: 2020-02-04 | End: 2020-02-04 | Stop reason: HOSPADM

## 2020-02-04 RX ORDER — NALOXONE HYDROCHLORIDE 0.4 MG/ML
.1-.4 INJECTION, SOLUTION INTRAMUSCULAR; INTRAVENOUS; SUBCUTANEOUS
Status: DISCONTINUED | OUTPATIENT
Start: 2020-02-04 | End: 2020-02-04 | Stop reason: HOSPADM

## 2020-02-04 RX ORDER — ONDANSETRON 2 MG/ML
4 INJECTION INTRAMUSCULAR; INTRAVENOUS EVERY 6 HOURS PRN
Status: DISCONTINUED | OUTPATIENT
Start: 2020-02-04 | End: 2020-02-04 | Stop reason: HOSPADM

## 2020-02-04 RX ORDER — LIDOCAINE 40 MG/G
CREAM TOPICAL
Status: DISCONTINUED | OUTPATIENT
Start: 2020-02-04 | End: 2020-02-04 | Stop reason: HOSPADM

## 2020-02-04 RX ORDER — FENTANYL CITRATE 50 UG/ML
INJECTION, SOLUTION INTRAMUSCULAR; INTRAVENOUS PRN
Status: DISCONTINUED | OUTPATIENT
Start: 2020-02-04 | End: 2020-02-04 | Stop reason: HOSPADM

## 2020-02-04 ASSESSMENT — MIFFLIN-ST. JEOR: SCORE: 1973.38

## 2020-02-04 NOTE — H&P
Pre-Endoscopy History and Physical     Kashif Mcdonald MRN# [id number]   YOB: 1960 Age: 59 year old     Date of Procedure: 2/4/2020  Primary care provider: Hong Leon  Type of Endoscopy: Colonoscopy with possible biopsy, possible polypectomy  Reason for Procedure: screen  Type of Anesthesia Anticipated: Conscious Sedation    HPI:    Kashif is a 59 year old male who will be undergoing the above procedure.      A history and physical has been performed. The patient's medications and allergies have been reviewed. The risks and benefits of the procedure and the sedation options and risks were discussed with the patient.  All questions were answered and informed consent was obtained.      He denies a personal or family history of anesthesia complications or bleeding disorders.     Patient Active Problem List   Diagnosis     Seasonal allergies     GERD (gastroesophageal reflux disease)     CARDIOVASCULAR SCREENING; LDL GOAL LESS THAN 130     Colon polyps     Advanced directives, counseling/discussion     Environmental allergies     Aortic root dilatation (H)     Class 1 obesity with serious comorbidity and body mass index (BMI) of 31.0 to 31.9 in adult, unspecified obesity type        Past Medical History:   Diagnosis Date     Aortic root dilatation (H) 08/2019    mild, 4.1 cm     Colocutaneous fistula      Colon polyps 1/12    adenomas x 3     GERD (gastroesophageal reflux disease)      Other specified disorders of eye and adnexa 1971    choroidal rupture - dr reid     Seasonal allergies     spring     Unspecified disorder of thyroid 2005    multinodular goiter - dr burris/evie        Past Surgical History:   Procedure Laterality Date     BIOPSY  2005    goiter - Dr Burris     COLONOSCOPY  01/2012    adenomatous polyps - due 5 yrs     STRESS ECHO ([facility]RO)  07/2019    normal     SURGICAL HISTORY OF -  Right 1997    RT SHOULDER REPAIR     SURGICAL HISTORY OF -   1981    WISDOM TEETH       Social  "History     Tobacco Use     Smoking status: Former Smoker     Packs/day: 0.25     Years: 25.00     Pack years: 6.25     Types: Cigarettes     Last attempt to quit: 2005     Years since quittin.5     Smokeless tobacco: Never Used   Substance Use Topics     Alcohol use: Yes     Alcohol/week: 0.0 - 0.8 standard drinks     Comment: 0-3 drinks a month       Family History   Problem Relation Age of Onset     Cancer Father         age 56 - lymphoma     Colon Cancer No family hx of        Prior to Admission medications    Medication Sig Start Date End Date Taking? Authorizing Provider   sertraline (ZOLOFT) 50 MG tablet Take 1 tablet (50 mg) by mouth daily 20  Yes Hong Leon MD   fluticasone (FLONASE) 50 MCG/ACT nasal spray Spray 1-2 sprays into both nostrils daily 20   Hong Leon MD   LORazepam (ATIVAN) 0.5 MG tablet Take 1 tablet (0.5 mg) by mouth every 6 hours as needed for anxiety 19   Hong Leon MD   omeprazole (PRILOSEC) 40 MG capsule Take 1 capsule (40 mg) by mouth daily Take 30-60 minutes before a meal. 17   Ngoc Mcgregor, PAKarishmaC       No Known Allergies     REVIEW OF SYSTEMS:   5 point ROS negative except as noted above in HPI, including Gen., Resp., CV, GI &  system review.    PHYSICAL EXAM:   BP (!) 129/94   Pulse 84   Resp 16   Ht 1.88 m (6' 2\")   Wt 108.9 kg (240 lb)   SpO2 98%   BMI 30.81 kg/m   Estimated body mass index is 30.81 kg/m  as calculated from the following:    Height as of this encounter: 1.88 m (6' 2\").    Weight as of this encounter: 108.9 kg (240 lb).   GENERAL APPEARANCE: alert, and oriented  MENTAL STATUS: alert  AIRWAY EXAM: Mallampatti Class I (visualization of the soft palate, fauces, uvula, anterior and posterior pillars)  RESP: lungs clear to auscultation - no rales, rhonchi or wheezes  CV: regular rates and rhythm  DIAGNOSTICS:    Not indicated    IMPRESSION   ASA Class 2 - Mild systemic disease    PLAN:   Plan for Colonoscopy with possible " biopsy, possible polypectomy. We discussed the risks, benefits and alternatives and the patient wished to proceed.    The above has been forwarded to the consulting provider.      Signed Electronically by: Bill De Paz MD  February 4, 2020

## 2020-02-04 NOTE — LETTER
January 28, 2020      Kashif Mcdonald  [id number] [facility] [id number]        Dear Kashif,         Please be aware that coverage of these services is subject to the terms and limitations of your health insurance plan.  Call member services at your health plan with any benefit or coverage questions.    Thank you for choosing [facility]. You are scheduled for the following service(s):    Date:  2-4-20             Procedure:  COLONOSCOPY  Doctor:        Zandra   Arrival Time:  0730  *Check in at Emergency/Endoscopy desk*  Procedure Time:  0800      Location:   [facility]        Endoscopy Department, First Floor (Enter through ER Doors) *        [address] [place], [place] [address] [phone] or [phone] ([facility]) to reschedule      MIRALAX -GATORADE  PREP  Colonoscopy is the most accurate test to detect colon polyps and colon cancer; and the only test where polyps can be removed. During this procedure, a doctor examines the lining of your large intestine and rectum through a flexible tube.   Transportation  You must arrange for a ride for the day of your procedure with a responsible adult. A taxi , Uber, etc, is not an option unless you are accompanied by a responsible adult. If you fail to arrange transportation with a responsible adult, your procedure will be cancelled and rescheduled.    Purchase the  following supplies at your local pharmacy:  - 2 (two) bisacodyl tablets: each tablet contains 5 mg.  (Dulcolax  laxative NOT Dulcolax  stool softener)   - 1 (one) 8.3 oz bottle of Polyethylene Glycol (PEG) 3350 Powder   (MiraLAX , Smooth LAX , ClearLAX  or equivalent)  - 64 oz Gatorade    Regular Gatorade, Gatorade G2 , Powerade , Powerade Zero  or Pedialyte  is acceptable. Red colored flavors are not allowed; all other colors (yellow, green, orange, purple and blue) are okay. It is also okay to buy two 2.12 oz packets of powdered  Gatorade that can be mixed with water to a total volume of 64 oz of liquid.  - 1 (one) 10 oz bottle of Magnesium Citrate (Red colored flavors are not allowed)  It is also okay for you to use a 0.5 oz package of powdered magnesium citrate (17 g) mixed with 10 oz of water.      PREPARATION FOR COLONOSCOPY    7 days before:    Discontinue fiber supplements and medications containing iron. This includes Metamucil  and Fibercon ; and multivitamins with iron.    3 days before:    Begin a low-fiber diet. A low-fiber diet helps making the cleanout more effective.     Examples of a low-fiber diet include (but are not limited to): white bread, white rice, pasta, crackers, fish, chicken, eggs, ground beef, creamy peanut butter, cooked/steamed/boiled vegetables, canned fruit, bananas, melons, milk, plain yogurt cheese, salad dressing and other condiments.     The following are not allowed on a low-fiber diet: seeds, nuts, popcorn, bran, whole wheat, corn, quinoa, raw fruits and vegetables, berries and dried fruit, beans and lentils.    For additional details on low-fiber diet, please refer to the table on the last page.    2 days before:    Continue the low-fiber diet.     Drink at least 8 glasses of water throughout the day.     Stop eating solid foods at 11:45 pm.    1 day before:    In the morning: begin a clear liquid diet (liquids you can see through).     Examples of a clear liquid diet include: water, clear broth or bouillon, Gatorade, Pedialyte or Powerade, carbonated and non-carbonated soft drinks (Sprite , 7-Up , ginger ale), strained fruit juices without pulp (apple, white grape, white cranberry), Jell-O  and popsicles.     The following are not allowed on a clear liquid diet: red liquids, alcoholic beverages, dairy products (milk, creamer, and yogurt), protein shakes, creamy broths, juice with pulp and chewing tobacco.    At noon: take 2 (two) bisacodyl tablets     At 4 (and no later than 6pm): start drinking the  Miralax-Gatorade preparation (8.3 oz of Miralax mixed with 64 oz of Gatorade in a large pitcher). Drink 1(one) 8 oz glass every 15 minutes thereafter, until the mixture is gone.    COLON CLEANSING TIPS: drink adequate amounts of fluids before and after your colon cleansing to prevent dehydration. Stay near a toilet because you will have diarrhea. Even if you are sitting on the toilet, continue to drink the cleansing solution every 15 minutes. If you feel nauseous or vomit, rinse your mouth with water, take a 15 to 30-minute-break and then continue drinking the solution. You will be uncomfortable until the stool has flushed from your colon (in about 2 to 4 hours). You may feel chilled.    Day of your procedure  You may take all of your morning medications including blood pressure medications, blood thinners (if you have not been instructed to stop these by our office), methadone, anti-seizure medications with sips of water 3 hours prior to your procedure or earlier. Do not take insulin or vitamins prior to your procedure. Continue the clear liquid diet.       4 hours prior: drink 10 oz of magnesium citrate. It may be easier to drink it with a straw.    STOP consuming all liquids after that.     Do not take anything by mouth during this time.     Allow extra time to travel to your procedure as you may need to stop and use a restroom along the way.    You are ready for the procedure, if you followed all instructions and your stool is no longer formed, but clear or yellow liquid. If you are unsure whether your colon is clean, please call our office at [phone] before you leave for your appointment.    Bring the following to your procedure:  - Insurance Card/Photo ID.   - List of current medications including over-the-counter medications and supplements.   - Your rescue inhaler if you currently use one to control asthma.    Canceling or rescheduling your appointment:   If you must cancel or reschedule your  appointment, please call [phone] as soon as possible.      COLONOSCOPY PRE-PROCEDURE CHECKLIST    If you have diabetes, ask your regular doctor for diet and medication restrictions.  If you take an anticoagulant or anti-platelet medication (such as Coumadin , Lovenox , Pradaxa , Xarelto , Eliquis , etc.), please call your primary doctor for advice on holding this medication.  If you take aspirin you may continue to do so.  If you are or may be pregnant, please discuss the risks and benefits of this procedure with your doctor.        What happens during a colonoscopy?    Plan to spend up to two hours, starting at registration time, at the endoscopy center the day of your procedure. The colonoscopy takes an average of 15 to 30 minutes. Recovery time is about 30 minutes.      Before the exam:    You will change into a gown.    Your medical history and medication list will be reviewed with you, unless that has been done over the phone prior to the procedure.     A nurse will insert an intravenous (IV) line into your hand or arm.    The doctor will meet with you and will give you a consent form to sign.  During the exam:     Medicine will be given through the IV line to help you relax.     Your heart rate and oxygen levels will be monitored. If your blood pressure is low, you may be given fluids through the IV line.     The doctor will insert a flexible hollow tube, called a colonoscope, into your rectum. The scope will be advanced slowly through the large intestine (colon).    You may have a feeling of fullness or pressure.     If an abnormal tissue or a polyp is found, the doctor may remove it through the endoscope for closer examination, or biopsy. Tissue removal is painless    After the exam:           Any tissue samples removed during the exam will be sent to a lab for evaluation. It may take 5-7 working days for you to be notified of the results.     A nurse will provide you with complete discharge  instructions before you leave the endoscopy center. Be sure to ask the nurse for specific instructions if you take blood thinners such as Aspirin, Coumadin or Plavix.     The doctor will prepare a full report for you and for the physician who referred you for the procedure.     Your doctor will talk with you about the initial results of your exam.      Medication given during the exam will prohibit you from driving for the rest of the day.     Following the exam, you may resume your normal diet. Your first meal should be light, no greasy foods. Avoid alcohol until the next day.     You may resume your regular activities the day after the procedure.         LOW-FIBER DIET    Foods RECOMMENDED Foods to AVOID   Breads, Cereal, Rice and Pasta:   White bread, rolls, biscuits, croissant and raquel toast.   Waffles, Czech toast and pancakes.   White rice, noodles, pasta, macaroni and peeled cooked potatoes.   Plain crackers and saltines.   Cooked cereals: farina, cream of rice.   Cold cereals: Puffed Rice , Rice Krispies , Corn Flakes  and Special K    Breads, Cereal, Rice and Pasta:   Breads or rolls with nuts, seeds or fruit.   Whole wheat, pumpernickel, rye breads and cornbread.   Potatoes with skin, brown or wild rice, and kasha (buckwheat).     Vegetables:   Tender cooked and canned vegetables without seeds: carrots, asparagus tips, green or wax beans, pumpkin, spinach, lima beans. Vegetables:   Raw or steamed vegetables.   Vegetables with seeds.   Sauerkraut.   Winter squash, peas, broccoli, Brussel sprouts, cabbage, onions, cauliflower, baked beans, peas and corn.   Fruits:   Strained fruit juice.   Canned fruit, except pineapple.   Ripe bananas and melon. Fruits:   Prunes and prune juice.   Raw fruits.   Dried fruits: figs, dates and raisins.   Milk/Dairy:   Milk: plain or flavored.   Yogurt, custard and ice cream.   Cheese and cottage cheese Milk/Dairy:     Meat and other proteins:   ground, well-cooked tender  beef, lamb, ham, veal, pork, fish, poultry and organ meats.   Eggs.   Peanut butter without nuts. Meat and other proteins:   Tough, fibrous meats with gristle.   Dry beans, peas and lentils.   Peanut butter with nuts.   Tofu.   Fats, Snack, Sweets, Condiments and Beverages:   Margarine, butter, oils, mayonnaise, sour cream and salad dressing, plain gravy.   Sugar, hard candy, clear jelly, honey and syrup.   Spices, cooked herbs, bouillon, broth and soups made with allowed vegetable, ketchup and mustard.   Coffee, tea and carbonated drinks.   Plain cakes, cookies and pretzels.   Gelatin, plain puddings, custard, ice cream, sherbet and popsicles. Fats, Snack, Sweets, Condiments and Beverages:   Nuts, seeds and coconut.   Jam, marmalade and preserves.   Pickles, olives, relish and horseradish.   All desserts containing nuts, seeds, dried fruit and coconut; or made from whole grains or bran.   Candy made with nuts or seeds.   Popcorn.           DIRECTIONS TO THE ENDOSCOPY DEPARTMENT     From the north ([facility])  Take [address] South, exit on [address] [address]. Get into the left hand jada, turn left (east), go one-half mile to [address] and turn left. Go north to the first stoplight, take a right on [address] and follow it to the Emergency entrance.    From the south ([facility])  Take 35N to the [address]E split and exit on [address] [address]. On [address] [address], turn left (west) to [address]. Turn right (north) on [address]. Go north to the first stoplight, take a right on [address] and follow it to the Emergency entrance.    From the east via 35E ([facility])  Take [address]E south to [address] [address] exit. Turn right on [address] [address]. Go west to [address]. Turn right (north) on [address]. Go to the first stoplight, take a right and follow on [address] to the Emergency entrance.    From the east via [address] ([facility])  [facility] [address]  [address]. Turn left (south) on [address] to [email or web address]. Turn left (east) on [email or web address] and follow it to the Emergency entrance.    From the west via [address] ([place])  Take [address] east to [address]. Turn right (south) on [address] to [email or web address]. Turn left (east) on [email or web address] and follow it to the Emergency entrance.

## 2020-02-04 NOTE — DISCHARGE INSTRUCTIONS
The patient has received a copy of the [place]  report the doctor has written and discharge instructions have been discussed with the patient and responsible adult.  All questions were addressed and answered prior to patient discharge.      Understanding Diverticulosis and Diverticulitis     Pouches or diverticula usually occur in the lower part of the colon called the sigmoid.      Diverticulitis occurs when the pouches become inflamed.     The colon (large intestine) is the last part of the digestive tract. It absorbs water from stool and changes it from a liquid to a solid. In certain cases, small pouches called diverticula can form in the colon wall. This condition is called diverticulosis. The pouches can become infected. If this happens, it becomes a more serious problem called diverticulitis. These problems can be painful. But they can be managed.   Managing Your Condition  Diet changes or taking medications are often tried first. These may be enough to bring relief. If the case is bad, surgery may be done. You and your doctor can discuss the plan that is best for you.  If You Have Diverticulosis  Diet changes are often enough to control symptoms. The main changes are adding fiber (roughage) and drinking more water. Fiber absorbs water as it travels through your colon. This helps your stool stay soft and move smoothly. Water helps this process. If needed, you may be told to take over-the-counter stool softeners. To help relieve pain, antispasmodic medications may be prescribed.  If You Have Diverticulitis  Treatment depends on how bad your symptoms are.  For mild symptoms: You may be put on a liquid diet for a short time. You may also be prescribed antibiotics. If these two steps relieve your symptoms, you may then be prescribed a high-fiber diet. If you still have symptoms, your doctor will discuss further treatment options with you.  For severe symptoms: You may need to be admitted to the hospital. There,  you can be given IV antibiotics and fluids. Once symptoms are under control, the above treatments may be tried. If these don t control your condition, your doctor may discuss the option of having surgery with you.  [facility] to Colon Health  Help keep your colon healthy with a diet that includes plenty of high-fiber fruits, vegetables, and whole grains. Drink plenty of liquids like water and juice. Your doctor may also recommend avoiding seeds and nuts.          [id number] [facility], [address] [place] [address], [place] [address]. All rights reserved. This information is not intended as a substitute for professional medical care. Always follow your healthcare professional's instructions.      Eating a High-Fiber Diet  Fiber is what gives strength and structure to plants. Most grains, beans, vegetables, and fruits contain fiber. Foods rich in fiber are often low in calories and fat, and they fill you up more. They may also reduce your risks for certain health problems. To find out the amount of fiber in canned, packaged, or frozen foods, read the  Nutrition Facts  label. It tells you how much fiber is in a serving.      Types of Fiber and Their Benefits  There are two types of fiber: insoluble and soluble. They both aid digestion and help you maintain a healthy weight.  Insoluble fiber: This is found in whole grains, cereals, certain fruits and vegetables (such as apple skin, corn, and carrots). Insoluble fiber may prevent constipation and reduce the risk of certain types of cancer.   Soluble fiber: This type of fiber is in oats, beans, and certain fruits and vegetables (such as strawberries and peas). Soluble fiber can reduce cholesterol (which may help lower the risk of heart disease), and helps control blood sugar levels.  Look for High-Fiber Foods  Whole-grain breads and cereals: Try to eat 6-8 ounces a day. Include wheat and oat bran cereals, whole-wheat muffins or toast, and corn tortillas in your meals.  Fruits:  Try to eat 2 cups a day. Apples, oranges, strawberries, pears, and bananas are good sources. (Note: Fruit juice is low in fiber.)  Vegetables: Try to eat 3 cups a day. Add asparagus, carrots, broccoli, peas, and corn to your meals.  Legumes (beans): One cup of cooked lentils gives you over 15 grams of fiber. Try navy beans, lentils, and chickpeas.  Seeds:  A small handful of seeds gives you about 3 grams of fiber. Try sunflower seeds.    Keep Track of Your Fiber  A healthy diet includes 31 grams of fiber a day if you have a 2,000-calorie diet. Keep track of how much fiber you eat. Start by reading food labels. Then eat a variety of foods high in fiber. Ask your doctor about supplemental fiber products.            7721-0978 [employer], [place] [address]. All rights reserved. This information is not intended as a substitute for professional medical care. Always follow your healthcare professional's instructions.      HIGH FIBER DIET  Fiber is present in all fruits, vegetables, cereals and grains. Fiber passes through the body undigested. A high fiber diet helps food move through the intestinal tract. The added bulk is helpful in preventing constipation. In people with diverticulosis it serves to clean out the pouches along the colon wall while preventing new ones from forming. A high fiber diet also reduces the risk of colon cancer, decreases blood cholesterol and prevents high blood sugar in people with diabetes.    The foods listed below are high in fiber and should be included in your diet. If you are not used to high fiber foods, start with 1 or 2 foods from this list. Every 3-4 days add a new one to your diet until you are eating 4 high fiber foods per day. This should give you 20-35 Gm of fiber/day. It is also important to drink a lot of water when you are on this diet (6-8 glasses a day). Water causes the fiber to swell and increases the benefit.    FOODS HIGH IN DIETARY  FIBER:  BREADS: Made with 100% whole wheat flour; gini, wheat or rye crackers; tortillas, bran muffins  CEREALS: Whole grain cereal with bran (Chex, Raisin Bran, Corn Bran), oatmeal, rolled oats, granola, wheat flakes, brown rice  NUTS: Any nuts  FRUITS: All fresh fruits along with edible skins, (bananas, citrus fruit, mangoes, pears, prunes, raisins, apples, pineapple, apricot, melon, jams and marmalades), fruit juices (especially prune juice)  VEGETABLES: All types, preferably raw or lightly cooked: especially, celery, eggplant, potatoes, spinach, broccoli, brussel sprouts, winter squash, carrots, cauliflower, soybeans, lentils, fresh and dried beans of all kinds  OTHER: Popcorn, any spices      3048-3921 [address], [place] [address]. All rights reserved. This information is not intended as a substitute for professional medical care. Always follow your healthcare professional's instructions.

## 2020-03-03 ENCOUNTER — OFFICE VISIT (OUTPATIENT)
Dept: PSYCHOLOGY | Facility: CLINIC | Age: 60
End: 2020-03-03
Payer: COMMERCIAL

## 2020-03-03 DIAGNOSIS — F41.0 PANIC DISORDER WITHOUT AGORAPHOBIA: Primary | ICD-10-CM

## 2020-03-03 PROCEDURE — 90834 PSYTX W PT 45 MINUTES: CPT | Performed by: PSYCHOLOGIST

## 2020-03-03 ASSESSMENT — ANXIETY QUESTIONNAIRES
1. FEELING NERVOUS, ANXIOUS, OR ON EDGE: NOT AT ALL
6. BECOMING EASILY ANNOYED OR IRRITABLE: SEVERAL DAYS
IF YOU CHECKED OFF ANY PROBLEMS ON THIS QUESTIONNAIRE, HOW DIFFICULT HAVE THESE PROBLEMS MADE IT FOR YOU TO DO YOUR WORK, TAKE CARE OF THINGS AT HOME, OR GET ALONG WITH OTHER PEOPLE: NOT DIFFICULT AT ALL
GAD7 TOTAL SCORE: 1
2. NOT BEING ABLE TO STOP OR CONTROL WORRYING: NOT AT ALL
7. FEELING AFRAID AS IF SOMETHING AWFUL MIGHT HAPPEN: NOT AT ALL
5. BEING SO RESTLESS THAT IT IS HARD TO SIT STILL: NOT AT ALL
3. WORRYING TOO MUCH ABOUT DIFFERENT THINGS: NOT AT ALL

## 2020-03-03 ASSESSMENT — PATIENT HEALTH QUESTIONNAIRE - PHQ9
5. POOR APPETITE OR OVEREATING: NOT AT ALL
SUM OF ALL RESPONSES TO PHQ QUESTIONS 1-9: 0

## 2020-03-03 NOTE — PATIENT INSTRUCTIONS
Treatment Plan    Patient's Name: Kashif Mcdonald MRN: [id number] YOB: 1960    Date: 10/15/2019     DSM5 Diagnoses: 300.01 (F41.0) Panic Disorder  Psychosocial / Contextual Factors: work related stress  WHODAS: 14    Referral / Collaboration:  Referral to another professional/service is not indicated at this time.    Anticipated number of session or this episode of care: 20      MeasurableTreatment Goal(s) related to diagnosis / functional impairment(s)  Goal 1: Patient will decrease avoidance of public places. Current = 2 times a week Goal = 2 times a month. As of 12/3/2019 no avoidance in the last month. New Goal = no avoidance for two months.     I will know I've met my goal when I can make it through shopping without getting the urge to duck and run.      Objective #A (Patient Action)    Patient will use cognitive strategies identified in therapy to challenge anxious thoughts.  Status: Completed - Date: 12/03/2019  Updated 12/3/2019 Achieved 3/3/2020     Intervention(s)  Therapist will assign homework of weekly exercise of being in public. Patient will use coping skills when feeling anxious. .    Goal 2: Client will maintain no panic attacks or avoidance of shopping centers.      I will know I've met my goal when can make it through shopping without getting the urge to duck and run.     Objective #A     Client will use at least two coping skills for anxiety management in the next two weeks.  Status: New - Date: 03/03/2020     Intervention(s)  Therapist will assign homework using belly breathing and drinking ice water to cool self.    Patient has reviewed and agreed to the above plan.        _________________________________________________________________________________  Client Signature        Date       Ariana Enriquez PsyD LP  3/3/2020

## 2020-03-03 NOTE — PROGRESS NOTES
"                                           Progress Note    Patient Name: Kashif Mcdonald  Date: 3/3/2020            Service Type: Individual  Video Visit: No     Session Start Time: 1:30PM  Session End Time: 2:20PM     Session Length: 50 minutes    Session #: 6    Attendees: Client attended alone     Treatment Plan Last Reviewed: 10/15/2019, 12/3/2019, 3/3/2020     PHQ-9 / KAYLEY-7 : 0/1    DATA  Interactive Complexity: No  Crisis: No       Progress Since Last Session (Related to Symptoms / Goals / Homework):   Symptoms: Improving using coping skills when shopping alone    Homework: Achieved / completed to satisfaction      Episode of Care Goals: Satisfactory progress - ACTION (Actively working towards change); Intervened by reinforcing change plan / affirming steps taken     Current / Ongoing Stressors and Concerns:   Stress with Work   Panic attacks and anxiety in public situations      Treatment Objective(s) Addressed in This Session:   identify at least two triggers for anxiety    The patient reported that he went shopping by himself and used coping skills when he felt anxious,overwhelmed, and frustrated. He reported that triggers for anxiety include his wife's cough and surgery to remove a tumor in her neck.    Patient spoke about his concern for his daughter's binge eating and sadness that he cannot \"protect her from herself.\"     Patient reported drinking cold water and \"baby breathing\" when he felt anxious in meetings. Patient reported that he helped his daughter through multiple panic attacks and looked for antecedents.      Intervention:   CBT: positive reinforcement, parallels, normalizing  Emotion Focused Therapy: emotion checking, emotion naming, empathetic attunement  Motivational Interviewing: open ended questions        ASSESSMENT: Current Emotional / Mental Status (status of significant symptoms):   Risk status (Self / Other harm or suicidal ideation)   Patient denies current fears or concerns for " personal safety.   Patient denies current or recent suicidal ideation or behaviors.   Patientdenies current or recent homicidal ideation or behaviors.   Patient denies current or recent self injurious behavior or ideation.   Patient denies other safety concerns.   Patient Patient reports there has been no change in risk factors since their last session.     PatientPatient reports there has been no change in protective factors since their last session.     Recommended that patient call 911 or go to the local ED should there be a change in any of these risk factors.     Appearance:   Appropriate    Eye Contact:   Good    Psychomotor Behavior: Normal    Attitude:   Cooperative    Orientation:   All   Speech    Rate / Production: Normal     Volume:  Normal    Mood:    Normal   Affect:    Appropriate    Thought Content:  Clear    Thought Form:  Coherent  Logical    Insight:    Good      Medication Review:   No changes to current psychiatric medication(s)     Medication Compliance:   Yes     Changes in Health Issues:   None reported     Chemical Use Review:   Substance Use: Chemical use reviewed, no active concerns identified      Tobacco Use: No current tobacco use.      Diagnosis:  1. Panic disorder without agoraphobia        Collateral Reports Completed:   Not Applicable    PLAN: (Patient Tasks / Therapist Tasks / Other)  The patient plans to continue to use coping skills when he notices anxiety.      Plan to update WHODAS. Plan to move to maintenance appointments every two months.     Ariana Enriquez PsyD LP 3/3/2020                                                          ______________________________________________________________________    Treatment Plan    Patient's Name: Kashif Mcdonald MRN: [id number] YOB: 1960    Date: 10/15/2019, 3/3/2020      DSM5 Diagnoses: 300.01 (F41.0) Panic Disorder  Psychosocial / Contextual Factors: work related stress  WHODAS: 14    Referral / Collaboration:  Referral  to another professional/service is not indicated at this time.    Anticipated number of session or this episode of care: 20      MeasurableTreatment Goal(s) related to diagnosis / functional impairment(s)  Goal 1: Patient will decrease avoidance of public places. Current = 2 times a week Goal = 2 times a month. As of 12/3/2019 no avoidance in the last month. New Goal = no avoidance for two months.     I will know I've met my goal when I can make it through shopping without getting the urge to duck and run.      Objective #A (Patient Action)    Patient will use cognitive strategies identified in therapy to challenge anxious thoughts.  Status: Completed - Date: 12/03/2019  Updated 12/3/2019 Achieved 3/3/2020     Intervention(s)  Therapist will assign homework of weekly exercise of being in public. Patient will use coping skills when feeling anxious. .    Goal 2: Client will maintain no panic attacks or avoidance of shopping centers.      I will know I've met my goal when can make it through shopping without getting the urge to duck and run.     Objective #A     Client will use at least two coping skills for anxiety management in the next two weeks.  Status: New - Date: 03/03/2020     Intervention(s)  Therapist will assign homework using belly breathing and drinking ice water to cool self.    Patient has reviewed and agreed to the above plan.        _________________________________________________________________________________  Client Signature        Date       Ariana Enriquez PsyD LP  3/3/2020

## 2020-03-04 ASSESSMENT — ANXIETY QUESTIONNAIRES: GAD7 TOTAL SCORE: 1

## 2020-04-20 ENCOUNTER — VIRTUAL VISIT (OUTPATIENT)
Dept: FAMILY MEDICINE | Facility: CLINIC | Age: 60
End: 2020-04-20
Payer: COMMERCIAL

## 2020-04-20 DIAGNOSIS — F41.9 ANXIETY: Primary | ICD-10-CM

## 2020-04-20 DIAGNOSIS — Z51.81 MEDICATION MONITORING ENCOUNTER: ICD-10-CM

## 2020-04-20 DIAGNOSIS — R73.03 PREDIABETES: ICD-10-CM

## 2020-04-20 DIAGNOSIS — Z13.6 CARDIOVASCULAR SCREENING; LDL GOAL LESS THAN 130: ICD-10-CM

## 2020-04-20 DIAGNOSIS — F41.0 PANIC ATTACK: ICD-10-CM

## 2020-04-20 DIAGNOSIS — K21.9 GASTROESOPHAGEAL REFLUX DISEASE WITHOUT ESOPHAGITIS: ICD-10-CM

## 2020-04-20 DIAGNOSIS — I77.810 AORTIC ROOT DILATATION (H): ICD-10-CM

## 2020-04-20 DIAGNOSIS — Z91.09 ENVIRONMENTAL ALLERGIES: ICD-10-CM

## 2020-04-20 PROCEDURE — 99214 OFFICE O/P EST MOD 30 MIN: CPT | Mod: TEL | Performed by: FAMILY MEDICINE

## 2020-04-20 PROCEDURE — 96127 BRIEF EMOTIONAL/BEHAV ASSMT: CPT | Mod: TEL | Performed by: FAMILY MEDICINE

## 2020-04-20 RX ORDER — FLUTICASONE PROPIONATE 50 MCG
1-2 SPRAY, SUSPENSION (ML) NASAL DAILY
Qty: 54.6 ML | Refills: 3 | Status: SHIPPED | OUTPATIENT
Start: 2020-04-20 | End: 2020-08-18

## 2020-04-20 RX ORDER — OMEPRAZOLE 40 MG/1
40 CAPSULE, DELAYED RELEASE ORAL DAILY
Qty: 90 CAPSULE | Refills: 1 | Status: SHIPPED | OUTPATIENT
Start: 2020-04-20 | End: 2021-03-24

## 2020-04-20 ASSESSMENT — ANXIETY QUESTIONNAIRES
1. FEELING NERVOUS, ANXIOUS, OR ON EDGE: NOT AT ALL
GAD7 TOTAL SCORE: 0
6. BECOMING EASILY ANNOYED OR IRRITABLE: NOT AT ALL
5. BEING SO RESTLESS THAT IT IS HARD TO SIT STILL: NOT AT ALL
7. FEELING AFRAID AS IF SOMETHING AWFUL MIGHT HAPPEN: NOT AT ALL
2. NOT BEING ABLE TO STOP OR CONTROL WORRYING: NOT AT ALL
3. WORRYING TOO MUCH ABOUT DIFFERENT THINGS: NOT AT ALL
IF YOU CHECKED OFF ANY PROBLEMS ON THIS QUESTIONNAIRE, HOW DIFFICULT HAVE THESE PROBLEMS MADE IT FOR YOU TO DO YOUR WORK, TAKE CARE OF THINGS AT HOME, OR GET ALONG WITH OTHER PEOPLE: NOT DIFFICULT AT ALL

## 2020-04-20 ASSESSMENT — PATIENT HEALTH QUESTIONNAIRE - PHQ9
SUM OF ALL RESPONSES TO PHQ QUESTIONS 1-9: 0
5. POOR APPETITE OR OVEREATING: NOT AT ALL

## 2020-04-20 NOTE — PROGRESS NOTES
[facility]    Telephone visit  - [phone]    Subjective    Kashif Mcdonald is a 59 year old male who is being evaluated via a billable telephone visit.      Chief Complaint   Patient presents with     Recheck Medication       PHQ 1/9/2020 3/3/2020 4/20/2020   PHQ-9 Total Score 0 0 0   Q9: Thoughts of better off dead/self-harm past 2 weeks Not at all Not at all Not at all     KAYLEY-7 SCORE 1/9/2020 3/3/2020 4/20/2020   Total Score 0 1 0     Overall doing well - meds no issues - on diet - on weight loss program - [employer] - through work - anxiety/panic well controlled no issues    Elevated CK - advised recheck soon - see 1/2020 notes/labs    Prediabetes - see notes/labs from 1/2020     Glucose   Date Value Ref Range Status   01/24/2020 114 (H) 70 - 99 mg/dL Final   01/09/2020 108 (H) 70 - 99 mg/dL Final   07/12/2019 102 (H) 70 - 99 mg/dL Final   12/05/2013 105 (H) 60 - 99 mg/dL Final     Comment:     Fasting specimen   01/04/2010 101 (H) 60 - 99 mg/dL Final     Lab Results   Component Value Date    A1C 5.7 01/24/2020       GERD - controlled    Environmental allergies - controlled    PMH    Past Medical History:   Diagnosis Date     Aortic root dilatation (H) 08/2019    mild, 4.1 cm     Colocutaneous fistula      Colon polyps 1/12    adenomas x 3     GERD (gastroesophageal reflux disease)      Other specified disorders of eye and adnexa 1971    choroidal rupture - dr reid     Seasonal allergies     spring     Unspecified disorder of thyroid 2005    multinodular goiter - dr herrera/andrisevic       [facility]    Past Surgical History:   Procedure Laterality Date     BIOPSY  2005    goiter - Dr Herrera     COLONOSCOPY  01/2012    adenomatous polyps - due 5 yrs     COLONOSCOPY N/A 2/4/2020    diverticulosis - due 5 yrs     STRESS ECHO (METRO)  07/2019    normal     SURGICAL HISTORY OF -  Right 1997    RT SHOULDER REPAIR     SURGICAL HISTORY OF -   1981    WISDOM TEETH       Medications    Current Outpatient  Medications   Medication Sig Dispense Refill     fluticasone (FLONASE) 50 MCG/ACT nasal spray Spray 1-2 sprays into both nostrils daily 54.6 mL 3     LORazepam (ATIVAN) 0.5 MG tablet Take 1 tablet (0.5 mg) by mouth every 6 hours as needed for anxiety 15 tablet 0     omeprazole (PRILOSEC) 40 MG DR capsule Take 1 capsule (40 mg) by mouth daily Take 30-60 minutes before a meal. 90 capsule 1     sertraline (ZOLOFT) 50 MG tablet Take 1 tablet (50 mg) by mouth daily 90 tablet 1       Allergies    Patient has no known allergies.    Family History    Family History   Problem Relation Age of Onset     Cancer Father         age 56 - lymphoma     Colon Cancer No family hx of        Social History    Social History     Socioeconomic History     Marital status:      Spouse name: Esther     Number of children: 2     Years of education: 14     Highest education level: Not on file   Occupational History     Employer: [employer]   Social Needs     Financial resource strain: Not on file     Food insecurity     Worry: Not on file     Inability: Not on file     Transportation needs     Medical: Not on file     Non-medical: Not on file   Tobacco Use     Smoking status: Former Smoker     Packs/day: 0.25     Years: 25.00     Pack years: 6.25     Types: Cigarettes     Last attempt to quit: 2005     Years since quittin.7     Smokeless tobacco: Never Used   Substance and Sexual Activity     Alcohol use: Yes     Alcohol/week: 0.0 - 0.8 standard drinks     Comment: 0-3 drinks a month     Drug use: No     Sexual activity: Yes     Partners: Female     Birth control/protection: Condom   Lifestyle     Physical activity     Days per week: Not on file     Minutes per session: Not on file     Stress: Not on file   Relationships     Social connections     Talks on phone: Not on file     Gets together: Not on file     Attends Rastafarian service: Not on file     Active member of club or organization: Not on file     Attends meetings of clubs  or organizations: Not on file     Relationship status: Not on file     Intimate partner violence     Fear of current or ex partner: Not on file     Emotionally abused: Not on file     Physically abused: Not on file     Forced sexual activity: Not on file   Other Topics Concern      Service Not Asked     Blood Transfusions Not Asked     Caffeine Concern Yes     Comment: 2 cups qd     Occupational Exposure Not Asked     Hobby Hazards Not Asked     Sleep Concern Not Asked     Stress Concern Not Asked     Weight Concern Not Asked     Special Diet Not Asked     Back Care Not Asked     Exercise Yes     Comment: work, limited by knees     Bike Helmet Not Asked     Seat Belt Yes     Self-Exams Not Asked     Parent/sibling w/ CABG, MI or angioplasty before 65F 55M? No   Social History Narrative     Not on file       Reviewed and updated as needed this visit by Provider           Review of Systems     ROS COMP: CONSTITUTIONAL: NEGATIVE for fever, chills, change in weight  INTEGUMENTARY/SKIN: NEGATIVE for worrisome rashes, moles or lesions  EYES: NEGATIVE for vision changes or irritation  ENT/MOUTH: NEGATIVE for ear, mouth and throat problems  RESP: NEGATIVE for significant cough or SOB  CV: NEGATIVE for chest pain, palpitations or peripheral edema  GI: NEGATIVE for nausea, abdominal pain, heartburn, or change in bowel habits  : NEGATIVE for frequency, dysuria, or hematuria  MUSCULOSKELETAL: NEGATIVE for significant arthralgias or myalgia  NEURO: NEGATIVE for weakness, dizziness or paresthesias  ENDOCRINE: NEGATIVE for temperature intolerance, skin/hair changes  HEME: NEGATIVE for bleeding problems  PSYCHIATRIC: NEGATIVE for changes in mood or affect    Objective    Reported vitals:  There were no vitals taken for this visit.     healthy, alert and no distress  Psych: Alert and oriented times 3; coherent speech, normal   rate and volume, able to articulate logical thoughts, able   to abstract reason, no tangential  "thoughts, no hallucinations   or delusions  His affect is normal     Diagnostic Test Results:  Labs reviewed in Epic    Assessment/Plan:      ICD-10-CM    1. Anxiety  F41.9 sertraline (ZOLOFT) 50 MG tablet   2. Panic attack  F41.0 sertraline (ZOLOFT) 50 MG tablet   3. Prediabetes  R73.03    4. Aortic root dilatation (H)  I77.810    5. CARDIOVASCULAR SCREENING; LDL GOAL LESS THAN 130  Z13.6    6. Gastroesophageal reflux disease without esophagitis  K21.9 omeprazole (PRILOSEC) 40 MG DR capsule   7. Environmental allergies  Z91.09 fluticasone (FLONASE) 50 MCG/ACT nasal spray   8. Medication monitoring encounter  Z51.81      Continue current cares  Med refills  Exercise, weight loss  Fasting visit in the fall    Return in about 4 months (around 8/20/2020) for Complete Physical, Medication Recheck Visit, Follow Up Chronic.    Phone call duration:  14 minutes    The patient has been notified of following:     \"This telephone visit will be conducted via a call between you and your physician/provider. We have found that certain health care needs can be provided without the need for a physical exam.  This service lets us provide the care you need with a short phone conversation.  If a prescription is necessary we can send it directly to your pharmacy.  If lab work is needed we can place an order for that and you can then stop by our lab to have the test done at a later time.    Telephone visits are billed at different rates depending on your insurance coverage. During this emergency period, for some insurers they may be billed the same as an in-person visit.  Please reach out to your insurance provider with any questions.    If during the course of the call the physician/provider feels a telephone visit is not appropriate, you will not be charged for this service.\"    Patient has given verbal consent for Telephone visit?  Yes    How would you like to obtain your AVS? Xuan Leon MD, FAAFP      Health " [facility]  [address] [place] [address]  [email or web address]  [email or web address]   Office: [phone]  Fax: [phone]  Pager: [phone]

## 2020-04-21 ASSESSMENT — ANXIETY QUESTIONNAIRES: GAD7 TOTAL SCORE: 0

## 2020-06-02 ENCOUNTER — FCC EXTENDED DOCUMENTATION (OUTPATIENT)
Dept: PSYCHOLOGY | Facility: CLINIC | Age: 60
End: 2020-06-02

## 2020-06-02 NOTE — PROGRESS NOTES
Discharge Summary  Multiple Sessions    Client Name: Kashif Mcdonald MRN#: [id number] YOB: 1960      Intake / Discharge Date: Intake: 10/8/19 Discharge 6/2/2020       DSM5 Diagnoses: (Sustained by DSM5 Criteria Listed Above)  Diagnoses: 300.01 (F41.0) Panic Disorder  Psychosocial & Contextual Factors: Stress related to work issues, intermittent panic attacks, wife went through an unexpected surgery.   WHODAS 2.0 (12 item) Score: 12          Presenting Concern:  Client reports the reason for seeking therapy at this time as panic attacks and learn coping skills and decrease anxiety.  Client stated that his symptoms have resulted in the following functional impairments: social interactions      Reason for Discharge:  Client did not return Goals appear to be completed and patient was moved to maintenance appointments every 2 months. Patient canceled his last telehealth appointment due to distancing due COVID-19.       Disposition at Time of Last Encounter:   Comments:   Patient appeared pleased with his progress. Patient spoke about how he processed his anxiety related to his wife's health.      Risk Management:   Client denies a history of suicidal ideation, suicide attempts, self-injurious behavior, homicidal ideation, homicidal behavior and and other safety concerns  Recommended that patient call 911 or go to the local ED should there be a change in any of these risk factors.      Referred To:  Patient was mailed a letter stating that they are welcome to return to [facility] in the future if they would like to.        Ariana Enriquez PsyD   6/2/2020

## 2021-01-15 ENCOUNTER — HEALTH MAINTENANCE LETTER (OUTPATIENT)
Age: 61
End: 2021-01-15

## 2021-03-18 DIAGNOSIS — F41.0 PANIC ATTACK: ICD-10-CM

## 2021-03-18 DIAGNOSIS — F41.9 ANXIETY: ICD-10-CM

## 2021-03-19 NOTE — TELEPHONE ENCOUNTER
LOV: 4/20/2020  Patient due for physical  No future appt scheduled    Routing to [facility] to assist in scheduling      Savanah Barrera RN  [facility]

## 2021-03-20 ENCOUNTER — HEALTH MAINTENANCE LETTER (OUTPATIENT)
Age: 61
End: 2021-03-20

## 2021-03-24 ENCOUNTER — VIRTUAL VISIT (OUTPATIENT)
Dept: FAMILY MEDICINE | Facility: CLINIC | Age: 61
End: 2021-03-24

## 2021-03-24 DIAGNOSIS — R73.03 PREDIABETES: ICD-10-CM

## 2021-03-24 DIAGNOSIS — Z13.6 CARDIOVASCULAR SCREENING; LDL GOAL LESS THAN 130: ICD-10-CM

## 2021-03-24 DIAGNOSIS — K21.9 GASTROESOPHAGEAL REFLUX DISEASE WITHOUT ESOPHAGITIS: ICD-10-CM

## 2021-03-24 DIAGNOSIS — F41.0 PANIC ATTACK: ICD-10-CM

## 2021-03-24 DIAGNOSIS — I77.810 AORTIC ROOT DILATATION (H): ICD-10-CM

## 2021-03-24 DIAGNOSIS — F41.9 ANXIETY: Primary | ICD-10-CM

## 2021-03-24 DIAGNOSIS — Z51.81 MEDICATION MONITORING ENCOUNTER: ICD-10-CM

## 2021-03-24 DIAGNOSIS — Z91.09 ENVIRONMENTAL ALLERGIES: ICD-10-CM

## 2021-03-24 PROCEDURE — 96127 BRIEF EMOTIONAL/BEHAV ASSMT: CPT | Mod: 95 | Performed by: FAMILY MEDICINE

## 2021-03-24 PROCEDURE — 99214 OFFICE O/P EST MOD 30 MIN: CPT | Mod: 95 | Performed by: FAMILY MEDICINE

## 2021-03-24 RX ORDER — FLUTICASONE PROPIONATE 50 MCG
1-2 SPRAY, SUSPENSION (ML) NASAL DAILY
Qty: 54.6 ML | Refills: 1 | Status: SHIPPED | OUTPATIENT
Start: 2021-03-24 | End: 2021-06-14

## 2021-03-24 RX ORDER — OMEPRAZOLE 40 MG/1
40 CAPSULE, DELAYED RELEASE ORAL DAILY
Qty: 90 CAPSULE | Refills: 1 | Status: SHIPPED | OUTPATIENT
Start: 2021-03-24 | End: 2021-06-14

## 2021-03-24 ASSESSMENT — ANXIETY QUESTIONNAIRES
3. WORRYING TOO MUCH ABOUT DIFFERENT THINGS: NOT AT ALL
1. FEELING NERVOUS, ANXIOUS, OR ON EDGE: SEVERAL DAYS
5. BEING SO RESTLESS THAT IT IS HARD TO SIT STILL: NOT AT ALL
GAD7 TOTAL SCORE: 2
7. FEELING AFRAID AS IF SOMETHING AWFUL MIGHT HAPPEN: NOT AT ALL
6. BECOMING EASILY ANNOYED OR IRRITABLE: SEVERAL DAYS
IF YOU CHECKED OFF ANY PROBLEMS ON THIS QUESTIONNAIRE, HOW DIFFICULT HAVE THESE PROBLEMS MADE IT FOR YOU TO DO YOUR WORK, TAKE CARE OF THINGS AT HOME, OR GET ALONG WITH OTHER PEOPLE: NOT DIFFICULT AT ALL
2. NOT BEING ABLE TO STOP OR CONTROL WORRYING: NOT AT ALL

## 2021-03-24 ASSESSMENT — PATIENT HEALTH QUESTIONNAIRE - PHQ9
SUM OF ALL RESPONSES TO PHQ QUESTIONS 1-9: 0
5. POOR APPETITE OR OVEREATING: NOT AT ALL

## 2021-03-24 NOTE — PROGRESS NOTES
[facility]    Telephone visit  - [phone]    Subjective    Kashif Mcdonald is a 60 year old male who is being evaluated via a billable telephone visit.      Chief Complaint   Patient presents with     Recheck Medication     Anxiety Follow-Up - Sertaline - last VV 4/2020      How are you doing with your anxiety since your last visit? Improved     Are you having other symptoms that might be associated with anxiety? No    Have you had a significant life event? No     Are you feeling depressed? No    Do you have any concerns with your use of alcohol or other drugs? No     PHQ 4/20/2020 5/11/2020 3/24/2021   PHQ-9 Total Score 0 0 0   Q9: Thoughts of better off dead/self-harm past 2 weeks Not at all Not at all Not at all     KAYLEY-7 SCORE 4/20/2020 5/11/2020 3/24/2021   Total Score - 0 (minimal anxiety) -   Total Score 0 0 2     CPX , last 1/20/2020    Prediabetes    Glucose   Date Value Ref Range Status   01/24/2020 114 (H) 70 - 99 mg/dL Final   01/09/2020 108 (H) 70 - 99 mg/dL Final   07/12/2019 102 (H) 70 - 99 mg/dL Final   12/05/2013 105 (H) 60 - 99 mg/dL Final     Comment:     Fasting specimen   01/04/2010 101 (H) 60 - 99 mg/dL Final     Lab Results   Component Value Date    A1C 5.7 01/24/2020     GERD - doing very well    Environmental allergies - starting, flonase helps    Social History     Tobacco Use     Smoking status: Former Smoker     Packs/day: 0.25     Years: 25.00     Pack years: 6.25     Types: Cigarettes     Quit date: 7/8/2005     Years since quitting: 15.7     Smokeless tobacco: Never Used   Substance Use Topics     Alcohol use: Yes     Alcohol/week: 0.0 - 0.8 standard drinks     Comment: 0-3 drinks a month     Drug use: No     PMH    Past Medical History:   Diagnosis Date     Anxiety 4/20/2020     Aortic root dilatation (H) 08/2019    mild, 4.1 cm     Colocutaneous fistula      Colon polyps 1/12    adenomas x 3     GERD (gastroesophageal reflux disease)      Other specified  disorders of eye and adnexa 1971    choroidal rupture - dr reid     Panic attack 4/20/2020     Prediabetes 4/20/2020     Seasonal allergies     spring     Unspecified disorder of thyroid 2005    multinodular goiter - dr herrera/shanonrisevic       [facility]    Past Surgical History:   Procedure Laterality Date     BIOPSY  2005    goiter - Dr Herrera     COLONOSCOPY  01/2012    adenomatous polyps - due 5 yrs     COLONOSCOPY N/A 2/4/2020    diverticulosis - due 5 yrs     STRESS ECHO (METRO)  07/2019    normal     SURGICAL HISTORY OF -  Right 1997    RT SHOULDER REPAIR     SURGICAL HISTORY OF -   1981    WISDOM TEETH       Medications    Current Outpatient Medications   Medication Sig Dispense Refill     fluticasone (FLONASE) 50 MCG/ACT nasal spray Spray 1-2 sprays into both nostrils daily 54.6 mL 1     omeprazole (PRILOSEC) 40 MG DR capsule Take 1 capsule (40 mg) by mouth daily Take 30-60 minutes before a meal. 90 capsule 1     sertraline (ZOLOFT) 50 MG tablet Take 1 tablet (50 mg) by mouth daily 90 tablet 1       Allergies    Patient has no known allergies.    Family History    Family History   Problem Relation Age of Onset     Cancer Father         age 56 - lymphoma     Colon Cancer No family hx of        Social History    Social History     Socioeconomic History     Marital status:      Spouse name: Esther     Number of children: 2     Years of education: 14     Highest education level: Not on file   Occupational History     Employer:    Social Needs     Financial resource strain: Not on file     Food insecurity     Worry: Not on file     Inability: Not on file     Transportation needs     Medical: Not on file     Non-medical: Not on file   Tobacco Use     Smoking status: Former Smoker     Packs/day: 0.25     Years: 25.00     Pack years: 6.25     Types: Cigarettes     Quit date: 7/8/2005     Years since quitting: 15.7     Smokeless tobacco: Never Used   Substance and Sexual Activity     Alcohol use: Yes      Alcohol/week: 0.0 - 0.8 standard drinks     Comment: 0-3 drinks a month     Drug use: No     Sexual activity: Yes     Partners: Female     Birth control/protection: Condom   Lifestyle     Physical activity     Days per week: Not on file     Minutes per session: Not on file     Stress: Not on file   Relationships     Social connections     Talks on phone: Not on file     Gets together: Not on file     Attends Confucianism service: Not on file     Active member of club or organization: Not on file     Attends meetings of clubs or organizations: Not on file     Relationship status: Not on file     Intimate partner violence     Fear of current or ex partner: Not on file     Emotionally abused: Not on file     Physically abused: Not on file     Forced sexual activity: Not on file   Other Topics Concern      Service Not Asked     Blood Transfusions Not Asked     Caffeine Concern Yes     Comment: 2 cups qd     Occupational Exposure Not Asked     Hobby Hazards Not Asked     Sleep Concern Not Asked     Stress Concern Not Asked     Weight Concern Not Asked     Special Diet Not Asked     Back Care Not Asked     Exercise Yes     Comment: work, limited by knees     Bike Helmet Not Asked     Seat Belt Yes     Self-Exams Not Asked     Parent/sibling w/ CABG, MI or angioplasty before 65F 55M? No   Social History Narrative     Not on file       Reviewed and updated as needed this visit by Provider                   Review of Systems     CONSTITUTIONAL: NEGATIVE for fever, chills, change in weight  INTEGUMENTARY/SKIN: NEGATIVE for worrisome rashes, moles or lesions  EYES: NEGATIVE for vision changes or irritation  ENT/MOUTH: NEGATIVE for ear, mouth and throat problems  RESP: NEGATIVE for significant cough or SOB  CV: NEGATIVE for chest pain, palpitations or peripheral edema  GI: NEGATIVE for nausea, abdominal pain, heartburn, or change in bowel habits  : NEGATIVE for frequency, dysuria, or hematuria  MUSCULOSKELETAL: NEGATIVE  for significant arthralgias or myalgia  NEURO: NEGATIVE for weakness, dizziness or paresthesias  ENDOCRINE: NEGATIVE for temperature intolerance, skin/hair changes  HEME: NEGATIVE for bleeding problems  PSYCHIATRIC: NEGATIVE for changes in mood or affect    Objective    Reported vitals:  There were no vitals taken for this visit.     healthy, alert and no distress  Psych: Alert and oriented times 3; coherent speech, normal   rate and volume, able to articulate logical thoughts, able   to abstract reason, no tangential thoughts, no hallucinations   or delusions  His affect is normal     RESP: No cough, no audible wheezing, able to talk in full sentences  Remainder of exam unable to be completed due to telephone visits    Diagnostic Test Results:  Labs reviewed in Epic         Assessment/Plan:      ICD-10-CM    1. Anxiety  F41.9 sertraline (ZOLOFT) 50 MG tablet   2. Panic attack  F41.0 sertraline (ZOLOFT) 50 MG tablet   3. Prediabetes  R73.03    4. Gastroesophageal reflux disease without esophagitis  K21.9 omeprazole (PRILOSEC) 40 MG DR capsule   5. Environmental allergies  Z91.09 fluticasone (FLONASE) 50 MCG/ACT nasal spray   6. Aortic root dilatation (H)  I77.810    7. CARDIOVASCULAR SCREENING; LDL GOAL LESS THAN 130  Z13.6    8. Medication monitoring encounter  Z51.81      Doing very well  Med refills done  CPX fasting soon  Has completed COVID    Return in about 3 months (around 6/24/2021), or if symptoms worsen or fail to improve, for Complete Physical, Medication Recheck Visit, Follow Up Chronic.    Phone call duration:  14 minutes             Hong Leon MD, FAAFP     [facility]  [address] [place] [id number]  [email or web address]   Office: [phone]  Fax: [phone]  Pager: [phone]

## 2021-03-25 ASSESSMENT — ANXIETY QUESTIONNAIRES: GAD7 TOTAL SCORE: 2

## 2021-06-11 NOTE — PROGRESS NOTES
"  3  SUBJECTIVE:   CC: Kashif Mcdonald is an 60 year old male who presents for preventive health visit.       Patient has been advised of split billing requirements and indicates understanding: Yes  Healthy Habits:    {additional problems to add (Optional):028450}    Today's PHQ-2 Score:   PHQ-2 ( 1999 Pfizer) 2/8/2018 3/18/2016   Q1: Little interest or pleasure in doing things 0 0   Q2: Feeling down, depressed or hopeless 0 0   PHQ-2 Score 0 0     {PHQ-2 LOOK IN ASSESSMENTS (Optional) :095617}  Abuse: Current or Past(Physical, Sexual or Emotional)- {YES/NO/NA:334496}  Do you feel safe in your environment? {YES/NO/NA:475201}    Have you ever done Advance Care Planning? (For example, a Health Directive, POLST, or a discussion with a medical provider or your loved ones about your wishes): { :432648}    Social History     Tobacco Use     Smoking status: Former Smoker     Packs/day: 0.25     Years: 25.00     Pack years: 6.25     Types: Cigarettes     Quit date: 7/8/2005     Years since quitting: 15.9     Smokeless tobacco: Never Used   Substance Use Topics     Alcohol use: Yes     Alcohol/week: 0.0 - 0.8 standard drinks     Comment: 0-3 drinks a month     If you drink alcohol do you typically have >3 drinks per day or >7 drinks per week? {ETOH :583560}                      Last PSA:   PSA   Date Value Ref Range Status   01/09/2020 0.53 0 - 4 ug/L Final     Comment:     Assay Method:  Chemiluminescence using Siemens Vista analyzer       Reviewed orders with patient. Reviewed health maintenance and updated orders accordingly - {Yes/No:376821::\"Yes\"}  {Chronicprobdata (Optional):292413}    Reviewed and updated as needed this visit by clinical staff                 Reviewed and updated as needed this visit by Provider                {HISTORY OPTIONS (Optional):326983}    ROS:  { :183111::\"CONSTITUTIONAL: NEGATIVE for fever, chills, change in weight\",\"INTEGUMENTARY/SKIN: NEGATIVE for worrisome rashes, moles or lesions\",\"EYES: " "NEGATIVE for vision changes or irritation\",\"ENT: NEGATIVE for ear, mouth and throat problems\",\"RESP: NEGATIVE for significant cough or SOB\",\"CV: NEGATIVE for chest pain, palpitations or peripheral edema\",\"GI: NEGATIVE for nausea, abdominal pain, heartburn, or change in bowel habits\",\" male: negative for dysuria, hematuria, decreased urinary stream, erectile dysfunction, urethral discharge\",\"MUSCULOSKELETAL: NEGATIVE for significant arthralgias or myalgia\",\"NEURO: NEGATIVE for weakness, dizziness or paresthesias\",\"PSYCHIATRIC: NEGATIVE for changes in mood or affect\"}    OBJECTIVE:   There were no vitals taken for this visit.  EXAM:  {Exam Choices:584782}    {Diagnostic Test Results (Optional):323082::\"Diagnostic Test Results:\",\"Labs reviewed in Epic\"}    ASSESSMENT/PLAN:   {Diag Picklist:431812}    Patient has been advised of split billing requirements and indicates understanding: {YES / NO:851523::\"Yes\"}  COUNSELING:  {MALE COUNSELING MESSAGES:287420::\"Reviewed preventive health counseling, as reflected in patient instructions\"}    Estimated body mass index is 30.81 kg/m  as calculated from the following:    Height as of 2/4/20: 1.88 m (6' 2\").    Weight as of 2/4/20: 108.9 kg (240 lb).    {Weight Management Plan (ACO) Complete if BMI is abnormal-  Ages 18-64  BMI >24.9.  Age 65+ with BMI <23 or >30 (Optional):946284}    He reports that he quit smoking about 15 years ago. His smoking use included cigarettes. He has a 6.25 pack-year smoking history. He has never used smokeless tobacco.      Counseling Resources:  ATP IV Guidelines  Pooled Cohorts Equation Calculator  FRAX Risk Assessment  ICSI Preventive Guidelines  Dietary Guidelines for Americans, 2010  USDA's MyPlate  ASA Prophylaxis  Lung CA Screening    Hong Leon MD  [facility] [place]  Answers for HPI/ROS submitted by the patient on 6/14/2021   Annual Exam:  Frequency of exercise:: 1 day/week  Getting at least 3 servings of Calcium per " day:: Yes  Diet:: Breakfast skipped  Bi-annual eye exam:: NO  Dental care twice a year:: NO  Sleep apnea or symptoms of sleep apnea:: None  Additional concerns today:: No  Duration of exercise:: 15-30 minutes  If you checked off any problems, how difficult have these problems made it for you to do your work, take care of things at home, or get along with other people?: Not difficult at all  PHQ9 TOTAL SCORE: 0  KAYLEY 7 TOTAL SCORE: 0

## 2021-06-14 ENCOUNTER — OFFICE VISIT (OUTPATIENT)
Dept: FAMILY MEDICINE | Facility: CLINIC | Age: 61
End: 2021-06-14
Payer: COMMERCIAL

## 2021-06-14 VITALS
BODY MASS INDEX: 31.06 KG/M2 | DIASTOLIC BLOOD PRESSURE: 72 MMHG | RESPIRATION RATE: 20 BRPM | OXYGEN SATURATION: 98 % | HEIGHT: 74 IN | TEMPERATURE: 98.3 F | HEART RATE: 72 BPM | WEIGHT: 242 LBS | SYSTOLIC BLOOD PRESSURE: 112 MMHG

## 2021-06-14 DIAGNOSIS — E78.5 HYPERLIPIDEMIA LDL GOAL <100: ICD-10-CM

## 2021-06-14 DIAGNOSIS — Z91.09 ENVIRONMENTAL ALLERGIES: ICD-10-CM

## 2021-06-14 DIAGNOSIS — F41.0 PANIC ATTACK: ICD-10-CM

## 2021-06-14 DIAGNOSIS — Z12.5 SCREENING FOR PROSTATE CANCER: ICD-10-CM

## 2021-06-14 DIAGNOSIS — E66.811 CLASS 1 OBESITY WITH SERIOUS COMORBIDITY AND BODY MASS INDEX (BMI) OF 31.0 TO 31.9 IN ADULT, UNSPECIFIED OBESITY TYPE: ICD-10-CM

## 2021-06-14 DIAGNOSIS — F41.9 ANXIETY: ICD-10-CM

## 2021-06-14 DIAGNOSIS — R73.03 PREDIABETES: ICD-10-CM

## 2021-06-14 DIAGNOSIS — Z12.11 SCREEN FOR COLON CANCER: ICD-10-CM

## 2021-06-14 DIAGNOSIS — K63.5 POLYP OF COLON, UNSPECIFIED PART OF COLON, UNSPECIFIED TYPE: ICD-10-CM

## 2021-06-14 DIAGNOSIS — L98.9 SKIN LESION: ICD-10-CM

## 2021-06-14 DIAGNOSIS — Z51.81 MEDICATION MONITORING ENCOUNTER: ICD-10-CM

## 2021-06-14 DIAGNOSIS — M79.642 PAIN OF LEFT HAND: ICD-10-CM

## 2021-06-14 DIAGNOSIS — I77.810 AORTIC ROOT DILATATION (H): ICD-10-CM

## 2021-06-14 DIAGNOSIS — Z23 NEED FOR TDAP VACCINATION: ICD-10-CM

## 2021-06-14 DIAGNOSIS — Z23 NEED FOR HEPATITIS A IMMUNIZATION: ICD-10-CM

## 2021-06-14 DIAGNOSIS — K21.9 GASTROESOPHAGEAL REFLUX DISEASE WITHOUT ESOPHAGITIS: ICD-10-CM

## 2021-06-14 DIAGNOSIS — Z00.00 ROUTINE GENERAL MEDICAL EXAMINATION AT A HEALTH CARE FACILITY: Primary | ICD-10-CM

## 2021-06-14 LAB
ALBUMIN SERPL-MCNC: 3.9 G/DL (ref 3.4–5)
ALBUMIN UR-MCNC: NEGATIVE MG/DL
ALP SERPL-CCNC: 59 U/L (ref 40–150)
ALT SERPL W P-5'-P-CCNC: 59 U/L (ref 0–70)
ANION GAP SERPL CALCULATED.3IONS-SCNC: 6 MMOL/L (ref 3–14)
APPEARANCE UR: CLEAR
AST SERPL W P-5'-P-CCNC: 38 U/L (ref 0–45)
BILIRUB SERPL-MCNC: 0.3 MG/DL (ref 0.2–1.3)
BILIRUB UR QL STRIP: NEGATIVE
BUN SERPL-MCNC: 25 MG/DL (ref 7–30)
CALCIUM SERPL-MCNC: 9.6 MG/DL (ref 8.5–10.1)
CHLORIDE SERPL-SCNC: 111 MMOL/L (ref 94–109)
CHOLEST SERPL-MCNC: 182 MG/DL
CK SERPL-CCNC: 513 U/L (ref 30–300)
CO2 SERPL-SCNC: 22 MMOL/L (ref 20–32)
COLOR UR AUTO: YELLOW
CREAT SERPL-MCNC: 1.05 MG/DL (ref 0.66–1.25)
CREAT UR-MCNC: 48 MG/DL
ERYTHROCYTE [DISTWIDTH] IN BLOOD BY AUTOMATED COUNT: 14.2 % (ref 10–15)
GFR SERPL CREATININE-BSD FRML MDRD: 77 ML/MIN/{1.73_M2}
GLUCOSE SERPL-MCNC: 82 MG/DL (ref 70–99)
GLUCOSE UR STRIP-MCNC: NEGATIVE MG/DL
HBA1C MFR BLD: 5.7 % (ref 0–5.6)
HCT VFR BLD AUTO: 46.4 % (ref 40–53)
HDLC SERPL-MCNC: 42 MG/DL
HGB BLD-MCNC: 15.5 G/DL (ref 13.3–17.7)
HGB UR QL STRIP: NEGATIVE
KETONES UR STRIP-MCNC: ABNORMAL MG/DL
LDLC SERPL CALC-MCNC: 113 MG/DL
LEUKOCYTE ESTERASE UR QL STRIP: NEGATIVE
MCH RBC QN AUTO: 28.9 PG (ref 26.5–33)
MCHC RBC AUTO-ENTMCNC: 33.4 G/DL (ref 31.5–36.5)
MCV RBC AUTO: 87 FL (ref 78–100)
MICROALBUMIN UR-MCNC: <5 MG/L
MICROALBUMIN/CREAT UR: NORMAL MG/G CR (ref 0–17)
NITRATE UR QL: NEGATIVE
NONHDLC SERPL-MCNC: 140 MG/DL
PH UR STRIP: 5 PH (ref 5–7)
PLATELET # BLD AUTO: 258 10E9/L (ref 150–450)
POTASSIUM SERPL-SCNC: 4.9 MMOL/L (ref 3.4–5.3)
PROT SERPL-MCNC: 7.6 G/DL (ref 6.8–8.8)
PSA SERPL-ACNC: 0.61 UG/L (ref 0–4)
RBC # BLD AUTO: 5.36 10E12/L (ref 4.4–5.9)
SODIUM SERPL-SCNC: 139 MMOL/L (ref 133–144)
SOURCE: ABNORMAL
SP GR UR STRIP: 1.01 (ref 1–1.03)
TRIGL SERPL-MCNC: 136 MG/DL
TSH SERPL DL<=0.005 MIU/L-ACNC: 1.22 MU/L (ref 0.4–4)
UROBILINOGEN UR STRIP-ACNC: 0.2 EU/DL (ref 0.2–1)
WBC # BLD AUTO: 7.8 10E9/L (ref 4–11)

## 2021-06-14 PROCEDURE — 82550 ASSAY OF CK (CPK): CPT | Performed by: FAMILY MEDICINE

## 2021-06-14 PROCEDURE — 82043 UR ALBUMIN QUANTITATIVE: CPT | Performed by: FAMILY MEDICINE

## 2021-06-14 PROCEDURE — 83036 HEMOGLOBIN GLYCOSYLATED A1C: CPT | Performed by: FAMILY MEDICINE

## 2021-06-14 PROCEDURE — 90715 TDAP VACCINE 7 YRS/> IM: CPT | Performed by: FAMILY MEDICINE

## 2021-06-14 PROCEDURE — 80053 COMPREHEN METABOLIC PANEL: CPT | Performed by: FAMILY MEDICINE

## 2021-06-14 PROCEDURE — 99396 PREV VISIT EST AGE 40-64: CPT | Mod: 25 | Performed by: FAMILY MEDICINE

## 2021-06-14 PROCEDURE — G0103 PSA SCREENING: HCPCS | Performed by: FAMILY MEDICINE

## 2021-06-14 PROCEDURE — 80061 LIPID PANEL: CPT | Performed by: FAMILY MEDICINE

## 2021-06-14 PROCEDURE — 81003 URINALYSIS AUTO W/O SCOPE: CPT | Mod: QW | Performed by: FAMILY MEDICINE

## 2021-06-14 PROCEDURE — 90472 IMMUNIZATION ADMIN EACH ADD: CPT | Performed by: FAMILY MEDICINE

## 2021-06-14 PROCEDURE — 85027 COMPLETE CBC AUTOMATED: CPT | Performed by: FAMILY MEDICINE

## 2021-06-14 PROCEDURE — 90632 HEPA VACCINE ADULT IM: CPT | Performed by: FAMILY MEDICINE

## 2021-06-14 PROCEDURE — 84443 ASSAY THYROID STIM HORMONE: CPT | Performed by: FAMILY MEDICINE

## 2021-06-14 PROCEDURE — 36415 COLL VENOUS BLD VENIPUNCTURE: CPT | Performed by: FAMILY MEDICINE

## 2021-06-14 PROCEDURE — 90471 IMMUNIZATION ADMIN: CPT | Performed by: FAMILY MEDICINE

## 2021-06-14 RX ORDER — OMEPRAZOLE 40 MG/1
40 CAPSULE, DELAYED RELEASE ORAL DAILY
Qty: 90 CAPSULE | Refills: 3 | Status: SHIPPED | OUTPATIENT
Start: 2021-06-14 | End: 2022-04-29

## 2021-06-14 RX ORDER — FLUTICASONE PROPIONATE 50 MCG
1-2 SPRAY, SUSPENSION (ML) NASAL DAILY
Qty: 54.6 ML | Refills: 3 | Status: SHIPPED | OUTPATIENT
Start: 2021-06-14 | End: 2022-04-29

## 2021-06-14 ASSESSMENT — ANXIETY QUESTIONNAIRES
GAD7 TOTAL SCORE: 0
4. TROUBLE RELAXING: NOT AT ALL
GAD7 TOTAL SCORE: 0
2. NOT BEING ABLE TO STOP OR CONTROL WORRYING: NOT AT ALL
GAD7 TOTAL SCORE: 0
1. FEELING NERVOUS, ANXIOUS, OR ON EDGE: NOT AT ALL
3. WORRYING TOO MUCH ABOUT DIFFERENT THINGS: NOT AT ALL
6. BECOMING EASILY ANNOYED OR IRRITABLE: NOT AT ALL
5. BEING SO RESTLESS THAT IT IS HARD TO SIT STILL: NOT AT ALL
7. FEELING AFRAID AS IF SOMETHING AWFUL MIGHT HAPPEN: NOT AT ALL
7. FEELING AFRAID AS IF SOMETHING AWFUL MIGHT HAPPEN: NOT AT ALL

## 2021-06-14 ASSESSMENT — PATIENT HEALTH QUESTIONNAIRE - PHQ9
SUM OF ALL RESPONSES TO PHQ QUESTIONS 1-9: 0
SUM OF ALL RESPONSES TO PHQ QUESTIONS 1-9: 0
10. IF YOU CHECKED OFF ANY PROBLEMS, HOW DIFFICULT HAVE THESE PROBLEMS MADE IT FOR YOU TO DO YOUR WORK, TAKE CARE OF THINGS AT HOME, OR GET ALONG WITH OTHER PEOPLE: NOT DIFFICULT AT ALL

## 2021-06-14 ASSESSMENT — MIFFLIN-ST. JEOR: SCORE: 1977.45

## 2021-06-14 NOTE — PROGRESS NOTES
[facility]  [place]    SUBJECTIVE    CC: Kashif Mcdonald is an 60 year old male who presents for preventative health visit.     Patient has been advised of split billing requirements and indicates understanding: Yes     Healthy Habits:     Getting at least 3 servings of Calcium per day:  Yes    Bi-annual eye exam:  NO    Dental care twice a year:  NO    Sleep apnea or symptoms of sleep apnea:  None    Diet:  Breakfast skipped    Frequency of exercise:  1 day/week    Duration of exercise:  15-30 minutes    PHQ-2 Total Score: 0    Additional concerns today:  No    Ability to successfully perform activities of daily living: Yes, no assistance needed  Home safety:  none identified   Hearing impairment: Yes,     Today's PHQ-2 Score:   PHQ-2 ( 1999 Pfizer) 6/14/2021   Q1: Little interest or pleasure in doing things 0   Q2: Feeling down, depressed or hopeless 0   PHQ-2 Score 0   Q1: Little interest or pleasure in doing things Not at all   Q2: Feeling down, depressed or hopeless Not at all   PHQ-2 Score 0       Abuse: Current or Past(Physical, Sexual or Emotional)- No  Do you feel safe in your environment? Yes    Have you ever done Advance Care Planning? (For example, a Health Directive, POLST, or a discussion with a medical provider or your loved ones about your wishes): No, advance care planning information given to patient to review.  Advanced care planning was discussed at today's visit.    Social History     Tobacco Use     Smoking status: Former Smoker     Packs/day: 0.25     Years: 25.00     Pack years: 6.25     Types: Cigarettes     Quit date: 7/8/2005     Years since quitting: 15.9     Smokeless tobacco: Never Used   Substance Use Topics     Alcohol use: Yes     Alcohol/week: 0.0 - 0.8 standard drinks     Comment: 0-3 drinks a month         Alcohol Use 6/14/2021   Prescreen: >3 drinks/day or >7 drinks/week? No   Prescreen: >3 drinks/day or >7 drinks/week? -       Last PSA:   PSA   Date Value Ref Range  Status   01/09/2020 0.53 0 - 4 ug/L Final     Comment:     Assay Method:  Chemiluminescence using Siemens Vista analyzer     Reviewed orders with patient. Reviewed health maintenance and updated orders accordingly - Yes    Prediabetes    Glucose   Date Value Ref Range Status   01/24/2020 114 (H) 70 - 99 mg/dL Final   01/09/2020 108 (H) 70 - 99 mg/dL Final   07/12/2019 102 (H) 70 - 99 mg/dL Final   12/05/2013 105 (H) 60 - 99 mg/dL Final     Comment:     Fasting specimen   01/04/2010 101 (H) 60 - 99 mg/dL Final     Lab Results   Component Value Date    A1C 5.7 01/24/2020     Lipids    Recent Labs   Lab Test 01/09/20  0753 12/05/13  0804   CHOL 207* 200   HDL 60 52   * 133*   TRIG 88 76   CHOLHDLRATIO  --  3.8     Anxiety    PHQ 5/11/2020 3/24/2021 6/14/2021   PHQ-9 Total Score 0 0 0   Q9: Thoughts of better off dead/self-harm past 2 weeks Not at all Not at all Not at all     KAYLEY-7 SCORE 5/11/2020 3/24/2021 6/14/2021   Total Score 0 (minimal anxiety) - 0 (minimal anxiety)   Total Score 0 2 0     Environmental Allergies - flares at times, claritin/flonase    GERD - minimal issues    Skin lesion - as noted    Left hand pain at times with decreased     Health Maintenance     Colonoscopy:  Due 2/2025   FIT:  given              PSA:  pending   DEXA:  NA    Health Maintenance Due   Topic Date Due     PSA  01/09/2021       Current Problem List    Patient Active Problem List   Diagnosis     Seasonal allergies     GERD (gastroesophageal reflux disease)     Hyperlipidemia LDL goal <100     Colon polyps     Advanced directives, counseling/discussion     Environmental allergies     Aortic root dilatation (H)     Class 1 obesity with serious comorbidity and body mass index (BMI) of 31.0 to 31.9 in adult, unspecified obesity type     Anxiety     Panic attack     Prediabetes       Past Medical History    Past Medical History:   Diagnosis Date     Anxiety 4/20/2020     Aortic root dilatation (H) 08/2019    mild, 4.1 cm      Colocutaneous fistula      Colon polyps 1/12    adenomas x 3     GERD (gastroesophageal reflux disease)      Hyperlipidemia LDL goal <100 10/31/2010     Other specified disorders of eye and adnexa 1971    choroidal rupture - dr reid     Panic attack 4/20/2020     Prediabetes 4/20/2020     Seasonal allergies     spring     Unspecified disorder of thyroid 2005    multinodular goiter - dr herrera/andrisevic       Past Surgical History    Past Surgical History:   Procedure Laterality Date     BIOPSY  2005    goiter - Dr Herrera     COLONOSCOPY  01/2012    adenomatous polyps - due 5 yrs     COLONOSCOPY N/A 2/4/2020    diverticulosis - due 5 yrs     STRESS ECHO (METRO)  07/2019    normal     SURGICAL HISTORY OF -  Right 1997    RT SHOULDER REPAIR     SURGICAL HISTORY OF -   1981    WISDOM TEETH       Current Medications    Current Outpatient Medications   Medication Sig Dispense Refill     fluticasone (FLONASE) 50 MCG/ACT nasal spray Spray 1-2 sprays into both nostrils daily 54.6 mL 3     omeprazole (PRILOSEC) 40 MG DR capsule Take 1 capsule (40 mg) by mouth daily Take 30-60 minutes before a meal. 90 capsule 3     sertraline (ZOLOFT) 50 MG tablet Take 1 tablet (50 mg) by mouth daily 90 tablet 3       Allergies    No Known Allergies    Immunizations    Immunization History   Administered Date(s) Administered     COVID-19,PF,Moderna 02/13/2021, 03/13/2021     HepA-Adult 01/09/2020, 06/14/2021     HepB 10/18/1995, 11/22/1995, 09/24/1996     Influenza (H1N1) 12/28/2009     Influenza (IIV3) PF 10/14/1999, 11/05/2007, 11/14/2008, 09/11/2009, 02/01/2013, 11/01/2013     Influenza Quad, Recombinant, p-free (RIV4) 12/03/2019     Influenza Vaccine, 6+MO IM (QUADRIVALENT W/PRESERVATIVES) 11/28/2014     TD (ADULT, 7+) 04/21/1997, 06/06/2004     Tdap (Adacel,Boostrix) 03/25/2012, 06/14/2021     Zoster vaccine recombinant adjuvanted (SHINGRIX) 01/09/2020, 07/09/2020       Family History    Family History   Problem Relation Age of  Onset     Cancer Father         age 56 - lymphoma     Colon Cancer No family hx of        Social History    Social History     Socioeconomic History     Marital status:      Spouse name: Esther     Number of children: 2     Years of education: 14     Highest education level: Not on file   Occupational History     Employer: [employer]   Social Needs     Financial resource strain: Not on file     Food insecurity     Worry: Not on file     Inability: Not on file     Transportation needs     Medical: Not on file     Non-medical: Not on file   Tobacco Use     Smoking status: Former Smoker     Packs/day: 0.25     Years: 25.00     Pack years: 6.25     Types: Cigarettes     Quit date: 7/8/2005     Years since quitting: 15.9     Smokeless tobacco: Never Used   Substance and Sexual Activity     Alcohol use: Yes     Alcohol/week: 0.0 - 0.8 standard drinks     Comment: 0-3 drinks a month     Drug use: No     Sexual activity: Yes     Partners: Female     Birth control/protection: Condom   Lifestyle     Physical activity     Days per week: Not on file     Minutes per session: Not on file     Stress: Not on file   Relationships     Social connections     Talks on phone: Not on file     Gets together: Not on file     Attends Congregation service: Not on file     Active member of club or organization: Not on file     Attends meetings of clubs or organizations: Not on file     Relationship status: Not on file     Intimate partner violence     Fear of current or ex partner: Not on file     Emotionally abused: Not on file     Physically abused: Not on file     Forced sexual activity: Not on file   Other Topics Concern      Service Not Asked     Blood Transfusions Not Asked     Caffeine Concern Yes     Comment: 2 cups qd     Occupational Exposure Not Asked     Hobby Hazards Not Asked     Sleep Concern Not Asked     Stress Concern Not Asked     Weight Concern Not Asked     Special Diet Not Asked     Back Care Not Asked      "Exercise Yes     Comment: work, limited by knees     Bike Helmet Not Asked     Seat Belt Yes     Self-Exams Not Asked     Parent/sibling w/ CABG, MI or angioplasty before 65F 55M? No   Social History Narrative     Not on file       ROS    CONSTITUTIONAL: NEGATIVE for fever, chills, change in weight  INTEGUMENTARY/SKIN: NEGATIVE for worrisome rashes, moles or lesions  EYES: NEGATIVE for vision changes or irritation  ENT/MOUTH: NEGATIVE for ear, mouth and throat problems  RESP: NEGATIVE for significant cough or SOB  CV: NEGATIVE for chest pain, palpitations or peripheral edema  GI: NEGATIVE for nausea, abdominal pain, heartburn, or change in bowel habits  : NEGATIVE for frequency, dysuria, or hematuria  MUSCULOSKELETAL: NEGATIVE for significant arthralgias or myalgia  NEURO: NEGATIVE for weakness, dizziness or paresthesias  ENDOCRINE: NEGATIVE for temperature intolerance, skin/hair changes  HEME: NEGATIVE for bleeding problems  PSYCHIATRIC: NEGATIVE for changes in mood or affect    OBJECTIVE    /72   Pulse 72   Temp 98.3  F (36.8  C)   Resp 20   Ht 1.88 m (6' 2\")   Wt 109.8 kg (242 lb)   SpO2 98%   BMI 31.07 kg/m      EXAM:    GENERAL: healthy, alert and no distress  EYES: Eyes grossly normal to inspection, PERRL and conjunctivae and sclerae normal  HENT: ear canals and TM's normal, nose and mouth without ulcers or lesions  NECK: no adenopathy, no asymmetry, masses, or scars and thyroid normal to palpation  RESP: lungs clear to auscultation - no rales, rhonchi or wheezes  CV: regular rate and rhythm, normal S1 S2, no S3 or S4, no murmur, click or rub, no peripheral edema and peripheral pulses strong  ABDOMEN: soft, nontender, no hepatosplenomegaly, no masses and bowel sounds normal   (male): testicles normal without atrophy or masses, no hernias and penis normal without urethral discharge  RECTAL: normal sphincter tone, no rectal masses, prostate of normal size for age, smooth, nontender without " masses/nodules and prostate trace + enlarged, nontender  MS: no gross musculoskeletal defects noted, no edema  SKIN: no suspicious lesions or rashes  NEURO: Normal strength and tone, mentation intact and speech normal  PSYCH: mentation appears normal, affect normal/bright  LYMPH: no cervical, supraclavicular, axillary, or inguinal adenopathy    Left lower anterior red scaly lesion - present for 1 yr, 1 x 1.5 cm - non responsive to HC 1%    DIAGNOSTICS/PROCEDURES    Pending    ASSESSMENT      ICD-10-CM    1. Routine general medical examination at a health care facility  Z00.00 Comprehensive metabolic panel     Lipid panel reflex to direct LDL Fasting     CK total     CBC with platelets     TSH with free T4 reflex     UA reflex to Microscopic and Culture     Albumin Random Urine Quantitative with Creat Ratio     Prostate spec antigen screen     Fecal colorectal cancer screen FIT     Hemoglobin A1c     REVIEW OF HEALTH MAINTENANCE PROTOCOL ORDERS   2. Prediabetes  R73.03 Comprehensive metabolic panel     Lipid panel reflex to direct LDL Fasting     UA reflex to Microscopic and Culture     Albumin Random Urine Quantitative with Creat Ratio     Hemoglobin A1c     REVIEW OF HEALTH MAINTENANCE PROTOCOL ORDERS   3. Hyperlipidemia LDL goal <100  E78.5 Comprehensive metabolic panel     Lipid panel reflex to direct LDL Fasting     CK total     REVIEW OF HEALTH MAINTENANCE PROTOCOL ORDERS   4. Aortic root dilatation (H)  I77.810 Comprehensive metabolic panel     Lipid panel reflex to direct LDL Fasting     Echocardiogram Complete     REVIEW OF HEALTH MAINTENANCE PROTOCOL ORDERS   5. Anxiety  F41.9 TSH with free T4 reflex     sertraline (ZOLOFT) 50 MG tablet     REVIEW OF HEALTH MAINTENANCE PROTOCOL ORDERS   6. Panic attack  F41.0 TSH with free T4 reflex     sertraline (ZOLOFT) 50 MG tablet     REVIEW OF HEALTH MAINTENANCE PROTOCOL ORDERS   7. Environmental allergies  Z91.09 fluticasone (FLONASE) 50 MCG/ACT nasal spray      REVIEW OF HEALTH MAINTENANCE PROTOCOL ORDERS   8. Gastroesophageal reflux disease without esophagitis  K21.9 CBC with platelets     omeprazole (PRILOSEC) 40 MG DR capsule     REVIEW OF HEALTH MAINTENANCE PROTOCOL ORDERS   9. Skin lesion  L98.9 SKIN CARE REFERRAL     REVIEW OF HEALTH MAINTENANCE PROTOCOL ORDERS   10. Pain of left hand  M79.642 Orthopedic & Spine  Referral     REVIEW OF HEALTH MAINTENANCE PROTOCOL ORDERS   11. Polyp of colon, unspecified part of colon, unspecified type  K63.5 Fecal colorectal cancer screen FIT     REVIEW OF HEALTH MAINTENANCE PROTOCOL ORDERS   12. Class 1 obesity with serious comorbidity and body mass index (BMI) of 31.0 to 31.9 in adult, unspecified obesity type  E66.9 REVIEW OF HEALTH MAINTENANCE PROTOCOL ORDERS    Z68.31    13. Screen for colon cancer  Z12.11 Fecal colorectal cancer screen FIT     REVIEW OF HEALTH MAINTENANCE PROTOCOL ORDERS   14. Screening for prostate cancer  Z12.5 Prostate spec antigen screen     REVIEW OF HEALTH MAINTENANCE PROTOCOL ORDERS   15. Medication monitoring encounter  Z51.81 Comprehensive metabolic panel     Lipid panel reflex to direct LDL Fasting     CK total     CBC with platelets     TSH with free T4 reflex     UA reflex to Microscopic and Culture     Albumin Random Urine Quantitative with Creat Ratio     Hemoglobin A1c     Echocardiogram Complete     REVIEW OF HEALTH MAINTENANCE PROTOCOL ORDERS   16. Need for Tdap vaccination  Z23 TDAP VACCINE (Adacel, Boostrix)  [[id number]]     REVIEW OF HEALTH MAINTENANCE PROTOCOL ORDERS   17. Need for hepatitis A immunization  Z23 HEPATITIS A VACCINE (ADULT)     REVIEW OF HEALTH MAINTENANCE PROTOCOL ORDERS       PLAN    Discussed treatment/modality options, including risk and benefits, he desires:    advised alcohol consumption 1oz per day or less, advised aspirin 81 mg po daily, advised 1 multivitamin per day, advised calcium 1333-6541 mg/d and Vitamin D 800-1200 IU/d, advised dentist every 6 months,  "advised diet, exercise, and weight loss, advised opthalmologist every 1-2 years, advised self testicular exam q month, further health care maintenance, further lab(s), immunization(s), medication refill(s), KAYLEY 7, completed and reviewed, PHQ-9, Depression Action Plan, completed and reviewed and observation    Discussed controversies surrounding PSA. Specifically reviewed 2017 USPSTF findings recommending discussion of PSA testing for men ages 55-69.  Reviewed findings of the  Randomized Study of Screening for Prostate Cancer which showed a 30% reduction in advanced stage prostate cancer and a 20% reduction in death rate from prostate cancer in this age group. PSA-based screening may prevent up to 2 deaths and up to 3 cases of metastatic disease per 1,000 men screened over 13 years.    We've elected to do PSA this year after discussing these controversies.    All diagnosis above reviewed and noted above, otherwise stable.      See Rapport orders for further details.      1) med refills    2) labs pending    3) immunization reviewed, Hep A, TdaP    4) Echo    5) Skin care    6) FSOC - Dr Varner    Return in about 1 year (around 6/14/2022) for Complete Physical, Medication Recheck Visit, and as needed.    Health Maintenance Due   Topic Date Due     PSA  01/09/2021       COUNSELING    Reviewed preventive health counseling, as reflected in patient instructions    BP Readings from Last 1 Encounters:   06/14/21 112/72     Estimated body mass index is 31.07 kg/m  as calculated from the following:    Height as of this encounter: 1.88 m (6' 2\").    Weight as of this encounter: 109.8 kg (242 lb).      Weight management plan: diet and exercise     reports that he quit smoking about 15 years ago. His smoking use included cigarettes. He has a 6.25 pack-year smoking history. He has never used smokeless tobacco.      Counseling Resources:    ATP IV Guidelines  Pooled Cohorts Equation Calculator  FRAX Risk Assessment  ICSI " Preventive Guidelines  Dietary Guidelines for Americans, 2010  USDA's MyPlate  ASA Prophylaxis  Lung CA Screening           Hong Leon MD, FAAFP     [facility]  [address] [place] [id number]  [email or web address]   Office: [phone]  Fax: [phone]  Pager: [phone]     Answers for HPI/ROS submitted by the patient on 6/14/2021   Annual Exam:  If you checked off any problems, how difficult have these problems made it for you to do your work, take care of things at home, or get along with other people?: Not difficult at all  PHQ9 TOTAL SCORE: 0  KAYLEY 7 TOTAL SCORE: 0

## 2021-06-14 NOTE — PROGRESS NOTES
SUBJECTIVE:   CC: Kashif Mcdonald is an 60 year old male who presents for preventative health visit.   Answers for HPI/ROS submitted by the patient on 6/14/2021   Annual Exam:  If you checked off any problems, how difficult have these problems made it for you to do your work, take care of things at home, or get along with other people?: Not difficult at all  PHQ9 TOTAL SCORE: 0  KAYLEY 7 TOTAL SCORE: 0      Patient has been advised of split billing requirements and indicates understanding: Yes  Healthy Habits:     Getting at least 3 servings of Calcium per day:  Yes    Bi-annual eye exam:  NO    Dental care twice a year:  NO    Sleep apnea or symptoms of sleep apnea:  None    Diet:  Breakfast skipped    Frequency of exercise:  1 day/week    Duration of exercise:  15-30 minutes    PHQ-2 Total Score: 0    Additional concerns today:  No    Ability to successfully perform activities of daily living: Yes, no assistance needed  Home safety:  none identified   Hearing impairment: Yes, {HEARING (ANNUAL WELLNESS VISIT):877212}    {Outside tests to abstract? :206919}    {additional problems to add (Optional):423805}    Today's PHQ-2 Score:   PHQ-2 ( 1999 Pfizer) 6/14/2021   Q1: Little interest or pleasure in doing things 0   Q2: Feeling down, depressed or hopeless 0   PHQ-2 Score 0   Q1: Little interest or pleasure in doing things Not at all   Q2: Feeling down, depressed or hopeless Not at all   PHQ-2 Score 0       Abuse: Current or Past(Physical, Sexual or Emotional)- { :545976}  Do you feel safe in your environment? { :806985}    Have you ever done Advance Care Planning? (For example, a Health Directive, POLST, or a discussion with a medical provider or your loved ones about your wishes): { :203050}    Social History     Tobacco Use     Smoking status: Former Smoker     Packs/day: 0.25     Years: 25.00     Pack years: 6.25     Types: Cigarettes     Quit date: 7/8/2005     Years since quitting: 15.9     Smokeless tobacco: Never  "Used   Substance Use Topics     Alcohol use: Yes     Alcohol/week: 0.0 - 0.8 standard drinks     Comment: 0-3 drinks a month     {Rooming Staff- Complete this question if Prescreen response is not shown below for today's visit. If you drink alcohol do you typically have >3 drinks per day or >7 drinks per week? (Optional):479682}    Alcohol Use 6/14/2021   Prescreen: >3 drinks/day or >7 drinks/week? No   Prescreen: >3 drinks/day or >7 drinks/week? -   {add AUDIT responses (Optional) (A score of 7 for adult men is an indication of hazardous drinking; a score of 8 or more is an indication of an alcohol use disorder.  A score of 7 or more for adult women is an indication of hazardous drinking or an alchohol use disorder):832649}    Last PSA:   PSA   Date Value Ref Range Status   01/09/2020 0.53 0 - 4 ug/L Final     Comment:     Assay Method:  Chemiluminescence using Siemens Vista analyzer       Reviewed orders with patient. Reviewed health maintenance and updated orders accordingly - { :052518::\"Yes\"}  {Chronicprobdata (optional):871596}    Reviewed and updated as needed this visit by clinical staff                 Reviewed and updated as needed this visit by Provider                {HISTORY OPTIONS (Optional):264987}    Review of Systems  {MALE ROS (Optional):529246::\"CONSTITUTIONAL: NEGATIVE for fever, chills, change in weight\",\"INTEGUMENTARY/SKIN: NEGATIVE for worrisome rashes, moles or lesions\",\"EYES: NEGATIVE for vision changes or irritation\",\"ENT: NEGATIVE for ear, mouth and throat problems\",\"RESP: NEGATIVE for significant cough or SOB\",\"CV: NEGATIVE for chest pain, palpitations or peripheral edema\",\"GI: NEGATIVE for nausea, abdominal pain, heartburn, or change in bowel habits\",\" male: negative for dysuria, hematuria, decreased urinary stream, erectile dysfunction, urethral discharge\",\"MUSCULOSKELETAL: NEGATIVE for significant arthralgias or myalgia\",\"NEURO: NEGATIVE for weakness, dizziness or " "paresthesias\",\"PSYCHIATRIC: NEGATIVE for changes in mood or affect\"}    OBJECTIVE:   There were no vitals taken for this visit.    Physical Exam  {Exam Choices (Optional):533877}    {Diagnostic Test Results (Optional):350390::\"Diagnostic Test Results:\",\"Labs reviewed in Epic\"}    ASSESSMENT/PLAN:   {Diag Picklist:914822}    Patient has been advised of split billing requirements and indicates understanding: {YES / NO:450754::\"Yes\"}  COUNSELING:   {MALE COUNSELING MESSAGES:616659::\"Reviewed preventive health counseling, as reflected in patient instructions\"}    Estimated body mass index is 30.81 kg/m  as calculated from the following:    Height as of 2/4/20: 1.88 m (6' 2\").    Weight as of 2/4/20: 108.9 kg (240 lb).     {Weight Management Plan (ACO) Complete if BMI is abnormal-  Ages 18-64  BMI >24.9.  Age 65+ with BMI <23 or >30 (Optional):136571}    He reports that he quit smoking about 15 years ago. His smoking use included cigarettes. He has a 6.25 pack-year smoking history. He has never used smokeless tobacco.      Counseling Resources:  ATP IV Guidelines  Pooled Cohorts Equation Calculator  FRAX Risk Assessment  ICSI Preventive Guidelines  Dietary Guidelines for Americans, 2010  USDA's MyPlate  ASA Prophylaxis  Lung CA Screening    Hong Leon MD  [facility] [place]  Answers for HPI/ROS submitted by the patient on 6/14/2021   Annual Exam:  If you checked off any problems, how difficult have these problems made it for you to do your work, take care of things at home, or get along with other people?: Not difficult at all  PHQ9 TOTAL SCORE: 0  KAYLEY 7 TOTAL SCORE: 0    "

## 2021-06-15 ASSESSMENT — PATIENT HEALTH QUESTIONNAIRE - PHQ9: SUM OF ALL RESPONSES TO PHQ QUESTIONS 1-9: 0

## 2021-06-15 ASSESSMENT — ANXIETY QUESTIONNAIRES: GAD7 TOTAL SCORE: 0

## 2021-06-16 DIAGNOSIS — R74.8 ELEVATED CK: Primary | ICD-10-CM

## 2021-06-17 ENCOUNTER — TRANSFERRED RECORDS (OUTPATIENT)
Dept: HEALTH INFORMATION MANAGEMENT | Facility: CLINIC | Age: 61
End: 2021-06-17

## 2021-06-24 ENCOUNTER — HOSPITAL ENCOUNTER (OUTPATIENT)
Dept: CARDIOLOGY | Facility: CLINIC | Age: 61
Discharge: HOME | End: 2021-06-24
Attending: FAMILY MEDICINE | Admitting: FAMILY MEDICINE
Payer: COMMERCIAL

## 2021-06-24 DIAGNOSIS — I77.810 AORTIC ROOT DILATATION (H): ICD-10-CM

## 2021-06-24 DIAGNOSIS — Z51.81 MEDICATION MONITORING ENCOUNTER: ICD-10-CM

## 2021-06-24 PROCEDURE — 93306 TTE W/DOPPLER COMPLETE: CPT

## 2021-06-24 PROCEDURE — 93306 TTE W/DOPPLER COMPLETE: CPT | Mod: 26 | Performed by: INTERNAL MEDICINE

## 2021-06-29 DIAGNOSIS — I77.810 AORTIC ROOT DILATATION (H): Primary | ICD-10-CM

## 2021-08-02 ENCOUNTER — OFFICE VISIT (OUTPATIENT)
Dept: CARDIOLOGY | Facility: CLINIC | Age: 61
End: 2021-08-02
Attending: FAMILY MEDICINE
Payer: COMMERCIAL

## 2021-08-02 VITALS
HEIGHT: 74 IN | WEIGHT: 233 LBS | OXYGEN SATURATION: 96 % | HEART RATE: 72 BPM | SYSTOLIC BLOOD PRESSURE: 124 MMHG | BODY MASS INDEX: 29.9 KG/M2 | DIASTOLIC BLOOD PRESSURE: 88 MMHG

## 2021-08-02 DIAGNOSIS — I77.810 AORTIC ROOT DILATATION (H): ICD-10-CM

## 2021-08-02 PROCEDURE — 93000 ELECTROCARDIOGRAM COMPLETE: CPT | Performed by: INTERNAL MEDICINE

## 2021-08-02 PROCEDURE — 99203 OFFICE O/P NEW LOW 30 MIN: CPT | Performed by: INTERNAL MEDICINE

## 2021-08-02 RX ORDER — ASPIRIN 81 MG/1
81 TABLET ORAL DAILY
COMMUNITY
End: 2022-02-16

## 2021-08-02 RX ORDER — MULTIVITAMIN,THERAPEUTIC
1 TABLET ORAL DAILY
COMMUNITY

## 2021-08-02 ASSESSMENT — MIFFLIN-ST. JEOR: SCORE: 1936.63

## 2021-08-02 NOTE — PROGRESS NOTES
HPI and Plan:   Very pleasant 60-year-old gentleman referred by primary care provider as he has an aortic root diameter of 4.0 cm.    There is a family history of bicuspid aortic valve.  Mother had a bicuspid aortic valve.  He himself does not have it but his son has it.  No cardiac symptoms.  EKG normal.    Patient echocardiogram showed a aortic root diameter as noted above.  However he does have a body surface area of 2.3.  Once the aortic root diameter is corrected for his body surface area it is actually normal.  I reassured the patient.  We had a nice chat.  No further work-up is necessary and he is discharged from our clinic.    Orders Placed This Encounter   Procedures     EKG 12-lead complete w/read - Clinics (performed today)       Orders Placed This Encounter   Medications     aspirin 81 MG EC tablet     Sig: Take 81 mg by mouth daily     multivitamin, therapeutic (THERA-VIT) TABS tablet     Sig: Take 1 tablet by mouth daily       Encounter Diagnosis   Name Primary?     Aortic root dilatation (H)        CURRENT MEDICATIONS:  Current Outpatient Medications   Medication Sig Dispense Refill     aspirin 81 MG EC tablet Take 81 mg by mouth daily       fluticasone (FLONASE) 50 MCG/ACT nasal spray Spray 1-2 sprays into both nostrils daily 54.6 mL 3     multivitamin, therapeutic (THERA-VIT) TABS tablet Take 1 tablet by mouth daily       omeprazole (PRILOSEC) 40 MG DR capsule Take 1 capsule (40 mg) by mouth daily Take 30-60 minutes before a meal. 90 capsule 3     sertraline (ZOLOFT) 50 MG tablet Take 1 tablet (50 mg) by mouth daily 90 tablet 3       ALLERGIES   No Known Allergies    PAST MEDICAL HISTORY:  Past Medical History:   Diagnosis Date     Anxiety 4/20/2020     Aortic root dilatation (H) 08/2019    mild, 4.1 cm     Colocutaneous fistula      Colon polyps 1/12    adenomas x 3     GERD (gastroesophageal reflux disease)      Hyperlipidemia LDL goal <100 10/31/2010     Other specified disorders of eye and  adnexa 1971    choroidal rupture - dr reid     Panic attack 2020     Prediabetes 2020     Seasonal allergies     spring     Unspecified disorder of thyroid     multinodular goiter - dr herrera/evie       PAST SURGICAL HISTORY:  Past Surgical History:   Procedure Laterality Date     BIOPSY  2005    goiter - Dr Herrera     COLONOSCOPY  2012    adenomatous polyps - due 5 yrs     COLONOSCOPY N/A 2020    diverticulosis - due 5 yrs     STRESS ECHO (METRO)  2019    normal     SURGICAL HISTORY OF -  Right 1997    RT SHOULDER REPAIR     SURGICAL HISTORY OF -       WISDOM TEETH       FAMILY HISTORY:  Family History   Problem Relation Age of Onset     Cancer Father         age 56 - lymphoma     Colon Cancer No family hx of        SOCIAL HISTORY:  Social History     Socioeconomic History     Marital status:      Spouse name: Esther     Number of children: 2     Years of education: 14     Highest education level: None   Occupational History     Employer: [employer]   Tobacco Use     Smoking status: Former Smoker     Packs/day: 0.25     Years: 25.00     Pack years: 6.25     Types: Cigarettes     Quit date: 2005     Years since quittin.0     Smokeless tobacco: Never Used   Substance and Sexual Activity     Alcohol use: Yes     Alcohol/week: 0.0 - 0.8 standard drinks     Comment: 0-3 drinks a month     Drug use: No     Sexual activity: Yes     Partners: Female     Birth control/protection: Condom   Other Topics Concern      Service Not Asked     Blood Transfusions Not Asked     Caffeine Concern Yes     Comment: 2 cups qd     Occupational Exposure Not Asked     Hobby Hazards Not Asked     Sleep Concern Not Asked     Stress Concern Not Asked     Weight Concern Not Asked     Special Diet Not Asked     Back Care Not Asked     Exercise Yes     Comment: work, limited by knees     Bike Helmet Not Asked     Seat Belt Yes     Self-Exams Not Asked     Parent/sibling w/ CABG, MI or  "angioplasty before 65F 55M? No   Social History Narrative     None     Social Determinants of Health     Financial Resource Strain:      Difficulty of Paying Living Expenses:    Food Insecurity:      Worried About Running Out of Food in the Last Year:      Ran Out of Food in the Last Year:    Transportation Needs:      Lack of Transportation (Medical):      Lack of Transportation (Non-Medical):    Physical Activity:      Days of Exercise per Week:      Minutes of Exercise per Session:    Stress:      Feeling of Stress :    Social Connections:      Frequency of Communication with Friends and Family:      Frequency of Social Gatherings with Friends and Family:      Attends Druze Services:      Active Member of Clubs or Organizations:      Attends Club or Organization Meetings:      Marital Status:    Intimate Partner Violence:      Fear of Current or Ex-Partner:      Emotionally Abused:      Physically Abused:      Sexually Abused:        Review of Systems:  Skin:  Negative     Eyes:  Negative    ENT:  Positive for tinnitus  Respiratory:  Negative    Cardiovascular:    Positive for;palpitations;edema  Gastroenterology: Negative    Genitourinary:  Negative    Musculoskeletal:  Positive for arthritis  Neurologic:  Negative    Psychiatric:  Positive for excessive stress;anxiety  Heme/Lymph/Imm:  Negative    Endocrine:  Negative      Physical Exam:  Vitals: /88 (BP Location: Right arm, Patient Position: Sitting, Cuff Size: Adult Large)   Pulse 72   Ht 1.88 m (6' 2\")   Wt 105.7 kg (233 lb)   SpO2 96%   BMI 29.92 kg/m      Constitutional:           Skin:             Head:           Eyes:           Lymph:      ENT:           Neck:           Respiratory:            Cardiac:                                                           GI:           Extremities and Muscular Skeletal:                 Neurological:           Psych:           Recent Lab Results:  LIPID RESULTS:  Lab Results   Component Value Date    " CHOL 182 06/14/2021    HDL 42 06/14/2021     (H) 06/14/2021    TRIG 136 06/14/2021    CHOLHDLRATIO 3.8 12/05/2013       LIVER ENZYME RESULTS:  Lab Results   Component Value Date    AST 38 06/14/2021    ALT 59 06/14/2021       CBC RESULTS:  Lab Results   Component Value Date    WBC 7.8 06/14/2021    RBC 5.36 06/14/2021    HGB 15.5 06/14/2021    HCT 46.4 06/14/2021    MCV 87 06/14/2021    MCH 28.9 06/14/2021    MCHC 33.4 06/14/2021    RDW 14.2 06/14/2021     06/14/2021       BMP RESULTS:  Lab Results   Component Value Date     06/14/2021    POTASSIUM 4.9 06/14/2021    CHLORIDE 111 (H) 06/14/2021    CO2 22 06/14/2021    ANIONGAP 6 06/14/2021    GLC 82 06/14/2021    BUN 25 06/14/2021    CR 1.05 06/14/2021    GFRESTIMATED 77 06/14/2021    GFRESTBLACK 89 06/14/2021    LILIAN 9.6 06/14/2021        A1C RESULTS:  Lab Results   Component Value Date    A1C 5.7 (H) 06/14/2021       INR RESULTS:  No results found for: INR        CC  Hong Leon MD  [address] [place] [address]

## 2021-08-02 NOTE — LETTER
8/2/2021    Hong Leon MD  [address] [facility] [id number]    RE: Kashif Schroederden       Dear Colleague,    I had the pleasure of seeing Kashif Mcdonald in the [facility].    HPI and Plan:   Very pleasant 60-year-old gentleman referred by primary care provider as he has an aortic root diameter of 4.0 cm.    There is a family history of bicuspid aortic valve.  Mother had a bicuspid aortic valve.  He himself does not have it but his son has it.  No cardiac symptoms.  EKG normal.    Patient echocardiogram showed a aortic root diameter as noted above.  However he does have a body surface area of 2.3.  Once the aortic root diameter is corrected for his body surface area it is actually normal.  I reassured the patient.  We had a nice chat.  No further work-up is necessary and he is discharged from our clinic.    Orders Placed This Encounter   Procedures     EKG 12-lead complete w/read - Clinics (performed today)       Orders Placed This Encounter   Medications     aspirin 81 MG EC tablet     Sig: Take 81 mg by mouth daily     multivitamin, therapeutic (THERA-VIT) TABS tablet     Sig: Take 1 tablet by mouth daily       Encounter Diagnosis   Name Primary?     Aortic root dilatation (H)        CURRENT MEDICATIONS:  Current Outpatient Medications   Medication Sig Dispense Refill     aspirin 81 MG EC tablet Take 81 mg by mouth daily       fluticasone (FLONASE) 50 MCG/ACT nasal spray Spray 1-2 sprays into both nostrils daily 54.6 mL 3     multivitamin, therapeutic (THERA-VIT) TABS tablet Take 1 tablet by mouth daily       omeprazole (PRILOSEC) 40 MG DR capsule Take 1 capsule (40 mg) by mouth daily Take 30-60 minutes before a meal. 90 capsule 3     sertraline (ZOLOFT) 50 MG tablet Take 1 tablet (50 mg) by mouth daily 90 tablet 3       ALLERGIES   No Known Allergies    PAST MEDICAL HISTORY:  Past Medical History:   Diagnosis Date     Anxiety 4/20/2020     Aortic root  dilatation (H) 2019    mild, 4.1 cm     Colocutaneous fistula      Colon polyps     adenomas x 3     GERD (gastroesophageal reflux disease)      Hyperlipidemia LDL goal <100 10/31/2010     Other specified disorders of eye and adnexa 1971    choroidal rupture - dr reid     Panic attack 2020     Prediabetes 2020     Seasonal allergies     spring     Unspecified disorder of thyroid     multinodular goiter - dr herrera/andrisevmargie       PAST SURGICAL HISTORY:  Past Surgical History:   Procedure Laterality Date     BIOPSY      goiter - Dr Herrera     COLONOSCOPY  2012    adenomatous polyps - due 5 yrs     COLONOSCOPY N/A 2020    diverticulosis - due 5 yrs     STRESS ECHO ([facility])  2019    normal     SURGICAL HISTORY OF -  Right     RT SHOULDER REPAIR     SURGICAL HISTORY OF -       WISDOM TEETH       FAMILY HISTORY:  Family History   Problem Relation Age of Onset     Cancer Father         age 56 - lymphoma     Colon Cancer No family hx of        SOCIAL HISTORY:  Social History     Socioeconomic History     Marital status:      Spouse name: Esther     Number of children: 2     Years of education: 14     Highest education level: None   Occupational History     Employer: [employer]   Tobacco Use     Smoking status: Former Smoker     Packs/day: 0.25     Years: 25.00     Pack years: 6.25     Types: Cigarettes     Quit date: 2005     Years since quittin.0     Smokeless tobacco: Never Used   Substance and Sexual Activity     Alcohol use: Yes     Alcohol/week: 0.0 - 0.8 standard drinks     Comment: 0-3 drinks a month     Drug use: No     Sexual activity: Yes     Partners: Female     Birth control/protection: Condom   Other Topics Concern      Service Not Asked     Blood Transfusions Not Asked     Caffeine Concern Yes     Comment: 2 cups qd     Occupational Exposure Not Asked     Hobby Hazards Not Asked     Sleep Concern Not Asked     Stress Concern Not Asked      "Weight Concern Not Asked     Special Diet Not Asked     Back Care Not Asked     Exercise Yes     Comment: work, limited by knees     Bike Helmet Not Asked     Seat Belt Yes     Self-Exams Not Asked     Parent/sibling w/ CABG, MI or angioplasty before 65F 55M? No   Social History Narrative     None     Social Determinants of Health     Financial Resource Strain:      Difficulty of Paying Living Expenses:    Food Insecurity:      Worried About Running Out of Food in the Last Year:      Ran Out of Food in the Last Year:    Transportation Needs:      Lack of Transportation (Medical):      Lack of Transportation (Non-Medical):    Physical Activity:      Days of Exercise per Week:      Minutes of Exercise per Session:    Stress:      Feeling of Stress :    Social Connections:      Frequency of Communication with Friends and Family:      Frequency of Social Gatherings with Friends and Family:      Attends Latter day Services:      Active Member of Clubs or Organizations:      Attends Club or Organization Meetings:      Marital Status:    Intimate Partner Violence:      Fear of Current or Ex-Partner:      Emotionally Abused:      Physically Abused:      Sexually Abused:        Review of Systems:  Skin:  Negative     Eyes:  Negative    ENT:  Positive for tinnitus  Respiratory:  Negative    Cardiovascular:    Positive for;palpitations;edema  Gastroenterology: Negative    Genitourinary:  Negative    Musculoskeletal:  Positive for arthritis  Neurologic:  Negative    Psychiatric:  Positive for excessive stress;anxiety  Heme/Lymph/Imm:  Negative    Endocrine:  Negative      Physical Exam:  Vitals: /88 (BP Location: Right arm, Patient Position: Sitting, Cuff Size: Adult Large)   Pulse 72   Ht 1.88 m (6' 2\")   Wt 105.7 kg (233 lb)   SpO2 96%   BMI 29.92 kg/m      Constitutional:           Skin:             Head:           Eyes:           Lymph:      ENT:           Neck:           Respiratory:            Cardiac:           "                                                 GI:           Extremities and Muscular Skeletal:                 Neurological:           Psych:           Recent Lab Results:  LIPID RESULTS:  Lab Results   Component Value Date    CHOL 182 06/14/2021    HDL 42 06/14/2021     (H) 06/14/2021    TRIG 136 06/14/2021    CHOLHDLRATIO 3.8 12/05/2013       LIVER ENZYME RESULTS:  Lab Results   Component Value Date    AST 38 06/14/2021    ALT 59 06/14/2021       CBC RESULTS:  Lab Results   Component Value Date    WBC 7.8 06/14/2021    RBC 5.36 06/14/2021    HGB 15.5 06/14/2021    HCT 46.4 06/14/2021    MCV 87 06/14/2021    MCH 28.9 06/14/2021    MCHC 33.4 06/14/2021    RDW 14.2 06/14/2021     06/14/2021       BMP RESULTS:  Lab Results   Component Value Date     06/14/2021    POTASSIUM 4.9 06/14/2021    CHLORIDE 111 (H) 06/14/2021    CO2 22 06/14/2021    ANIONGAP 6 06/14/2021    GLC 82 06/14/2021    BUN 25 06/14/2021    CR 1.05 06/14/2021    GFRESTIMATED 77 06/14/2021    GFRESTBLACK 89 06/14/2021    LILIAN 9.6 06/14/2021        A1C RESULTS:  Lab Results   Component Value Date    A1C 5.7 (H) 06/14/2021       INR RESULTS:  No results found for: INR        CC  Hong Leon MD  [address] [place] [id number]                    Thank you for allowing me to participate in the care of your patient.      Sincerely,     DR BELKIS KENT MD     [facility]  cc:   Hong Leon MD  [address] [place] [id number]

## 2021-10-24 ENCOUNTER — HEALTH MAINTENANCE LETTER (OUTPATIENT)
Age: 61
End: 2021-10-24

## 2022-01-09 ENCOUNTER — E-VISIT (OUTPATIENT)
Dept: FAMILY MEDICINE | Facility: CLINIC | Age: 62
End: 2022-01-09
Payer: COMMERCIAL

## 2022-01-09 DIAGNOSIS — Z20.822 SUSPECTED COVID-19 VIRUS INFECTION: Primary | ICD-10-CM

## 2022-01-09 PROCEDURE — 99421 OL DIG E/M SVC 5-10 MIN: CPT | Performed by: PHYSICIAN ASSISTANT

## 2022-01-09 NOTE — LETTER
January 9, 2022      Kashif Mcdonald  [address] [facility] [id number]        To Whom It May Concern:  Please excuse patient until they receive a negative COVID test. Upon negative test, they may return.         Sincerely,        Justin Chavez PA-C

## 2022-01-10 NOTE — PATIENT INSTRUCTIONS
Kashif,      Based on your responses, you may have coronavirus (COVID-19). This illness can cause fever, cough and trouble breathing. Many people get a mild case and get better on their own. Some people can get very sick.    Will I be tested for COVID-19?  We would like to test you for COVID-19 virus. I have placed orders for this test.     To schedule: go to your [employer] home page and scroll down to the section that says  You have an appointment that needs to be scheduled  and click the large green button that says  Schedule Now  and follow the steps to find the next available openings.    If you are unable to complete these [employer] scheduling steps, please call [phone] to schedule your testing.     Return to work/school/ guidance:  Please let your workplace manager and staffing office know when your isolation ends.       If you receive a positive COVID-19 test result, follow the guidance of the those who are giving you the results. Usually the return to work is 10 days from symptom onset or positive test date, (or in some cases 20 days if you are immunocompromised). If your symptoms started after your positive test, the 10 days should start when your symptoms started.   o If you work at [employer], you must also be cleared by Employee Occupational Health and Safety to return to work.      If you receive a negative COVID-19 test result and did not have a high risk exposure to someone with a known positive COVID-19 test, you can return to work once you're free of fever for 24 hours without fever-reducing medication and your symptoms are improving or resolved.    If you receive a negative COVID-19 test and had a high-risk exposure to someone who has tested positive for COVID-19 then you can return to work 14 days after your last contact with the positive individual. Follow quarantine guidance given by your doctor or public health officials.     Sign up for [employer]:  We know it's scary to hear  that you might have COVID-19. We want to track your symptoms to make sure you're okay over the next 2 weeks. Please look for an email from [employer]--this is a free, online program that we'll use to keep in touch. To sign up, follow the link in the email you will receive. Learn more at [email or web address]    How can I take care of myself?  Over the counter medications may help with your symptoms like congestion, cough, chills, or fever.    There are not many effective prescription treatments for early COVID-19. Hydroxychloroquine, ivermectin, and azithromycin are not effective or recommended for COVID-19.    If your symptoms started in the last 10 days, you may be able to receive a treatment with monoclonal antibodies. This treatment can lower your risk of severe illness and going to the hospital. It is given through an IV or under your skin (subcutaneous) and must be given at an infusion center. You must be 12 or older, weight at least 88 pounds, and have a positive COVID-19 test.     If you would like to sign up to be considered to receive the monoclonal antibody medicine, please complete a participation form through the [facility] here: MNRAP ([email or web address]). You may also call the [facility] COVID-19 Public Hotline at 1-140.288.2906 (open Mon-Fri: 9am-7pm and Sat: 10am-6pm).     Not all people who are eligible will receive the medicine, since supply is limited. You will be contacted in the next 1 to 2 business days only if you are selected. If you do not receive a call, you have not been selected to receive the medicine. If you have any questions about this medication, please contact your primary care provider. For more information, see [email or web address]      Get lots of rest. Drink extra fluids (unless a doctor has told you not to)    Take Tylenol (acetaminophen) or ibuprofen for fever or  pain. If you have liver or kidney problems, ask your family doctor if it's okay to take Tylenol o ibuprofen    Take over the counter medications for your symptoms, as directed by your doctor. You may also talk to your pharmacist.      If you have other health problems (like cancer, heart failure, an organ transplant or severe kidney disease): Call your specialty clinic if you don't feel better in the next 2 days.    Know when to call 911. Emergency warning signs include:  o Trouble breathing or shortness of breath  o Pain or pressure in the chest that doesn't go away  o Feeling confused like you haven't felt before, or not being able to wake up  o Bluish-colored lips or face    Where can I get more information?     [facility] - About COVID-19: [email or web address]     CDC - What to Do If You're Sick:     www.cdc.gov/coronavirus/2019-ncov/about/steps-when-sick.html    CDC - Ending Home Isolation:  https://www.cdc.gov/coronavirus/2019-ncov/your-health/quarantine-isolation.html    CDC - Caring for Someone:  www.cdc.gov/coronavirus/2019-ncov/if-you-are-sick/care-for-someone.html    [facility] clinical trials (COVID-19 research studies): clinicalaffairs.[facility]-clinical-trials    Below are the COVID-19 hotlines at the [facility] ([facility]). Interpreters are available.  o For health questions: Call 906-303-5432 or 1-948.440.8448 (7 a.m. to 7 p.m.)  o For questions about schools and childcare: Call 132-666-1108 or 1-114.106.5568 (7 a.m. to 7 p.m.)

## 2022-02-16 ENCOUNTER — ANCILLARY PROCEDURE (OUTPATIENT)
Dept: GENERAL RADIOLOGY | Facility: CLINIC | Age: 62
End: 2022-02-16
Attending: FAMILY MEDICINE
Payer: COMMERCIAL

## 2022-02-16 ENCOUNTER — OFFICE VISIT (OUTPATIENT)
Dept: FAMILY MEDICINE | Facility: CLINIC | Age: 62
End: 2022-02-16
Payer: OTHER MISCELLANEOUS

## 2022-02-16 VITALS
DIASTOLIC BLOOD PRESSURE: 72 MMHG | OXYGEN SATURATION: 98 % | RESPIRATION RATE: 16 BRPM | TEMPERATURE: 97.1 F | HEIGHT: 74 IN | SYSTOLIC BLOOD PRESSURE: 118 MMHG | BODY MASS INDEX: 28.88 KG/M2 | HEART RATE: 101 BPM | WEIGHT: 225 LBS

## 2022-02-16 DIAGNOSIS — S50.01XA CONTUSION OF RIGHT ELBOW, INITIAL ENCOUNTER: ICD-10-CM
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
## 2022-02-16 DIAGNOSIS — M54.2 CERVICAL SPINE PAIN: ICD-10-CM

## 2022-02-16 DIAGNOSIS — M54.6 PAIN IN THORACIC SPINE: ICD-10-CM
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
## 2022-02-16 PROCEDURE — 99213 OFFICE O/P EST LOW 20 MIN: CPT | Performed by: FAMILY MEDICINE

## 2022-02-16 PROCEDURE — 72070 X-RAY EXAM THORAC SPINE 2VWS: CPT | Mod: FY | Performed by: RADIOLOGY

## 2022-02-16 PROCEDURE — 72040 X-RAY EXAM NECK SPINE 2-3 VW: CPT | Mod: FY | Performed by: RADIOLOGY

## 2022-02-16 ASSESSMENT — PATIENT HEALTH QUESTIONNAIRE - PHQ9
5. POOR APPETITE OR OVEREATING: NOT AT ALL
SUM OF ALL RESPONSES TO PHQ QUESTIONS 1-9: 0

## 2022-02-16 ASSESSMENT — ANXIETY QUESTIONNAIRES
1. FEELING NERVOUS, ANXIOUS, OR ON EDGE: NOT AT ALL
5. BEING SO RESTLESS THAT IT IS HARD TO SIT STILL: NOT AT ALL
GAD7 TOTAL SCORE: 0
IF YOU CHECKED OFF ANY PROBLEMS ON THIS QUESTIONNAIRE, HOW DIFFICULT HAVE THESE PROBLEMS MADE IT FOR YOU TO DO YOUR WORK, TAKE CARE OF THINGS AT HOME, OR GET ALONG WITH OTHER PEOPLE: NOT DIFFICULT AT ALL
7. FEELING AFRAID AS IF SOMETHING AWFUL MIGHT HAPPEN: NOT AT ALL
3. WORRYING TOO MUCH ABOUT DIFFERENT THINGS: NOT AT ALL
6. BECOMING EASILY ANNOYED OR IRRITABLE: NOT AT ALL
2. NOT BEING ABLE TO STOP OR CONTROL WORRYING: NOT AT ALL

## 2022-02-16 NOTE — PATIENT INSTRUCTIONS
1) heat/ice, stretching    2) tylenol 1000 mg every 8 hrs with food, alternate with ibuprofen 800 mg every 8 hrs    3) consider PT/FSOC    4) RTW - no restrictions, may need to change

## 2022-02-16 NOTE — PROGRESS NOTES
"  Assessment & Plan       ICD-10-CM    1. Fall due to slipping on ice or snow, initial encounter  W00.9XXA XR Cervical Spine 2/3 Views     XR Thoracic Spine 2 Views   2. Pain in thoracic spine  M54.6 XR Cervical Spine 2/3 Views     XR Thoracic Spine 2 Views   3. Cervical spine pain  M54.2 XR Cervical Spine 2/3 Views     XR Thoracic Spine 2 Views   4. Contusion of right elbow, initial encounter  S50.01XA XR Cervical Spine 2/3 Views     XR Thoracic Spine 2 Views     Discussed treatment/modality options, including risk and benefits, he desires:    1) heat/ice, stretching    2) tylenol 1000 mg every 8 hrs with food, alternate with ibuprofen 800 mg every 8 hrs    3) consider PT/FSOC    4) RTW - desires no restrictions, may need to change    5) xrays pending    All diagnosis above reviewed and noted above, otherwise stable.      See [facility] orders for further details.      BMI:   Estimated body mass index is 28.89 kg/m  as calculated from the following:    Height as of this encounter: 1.88 m (6' 2\").    Weight as of this encounter: 102.1 kg (225 lb).     Return in about 1 week (around 2/23/2022) for Follow Up Acute.    No LOS data to display    Doing chart review, history and exam, documentation and further activities as noted.           Hong Leon MD, FAAFP     [facility]  [address] [place] [id number]  [email or web address]   Office: [phone]  Fax: [phone]  Pager: [phone]       Jamshid Rowland is a 61 year old who presents for the following health issues     Fall    Musculoskeletal Problem    History of Present Illness     Reason for visit:  Slip & fall neck pain kink  Symptom onset:  1-3 days ago    He eats 0-1 servings of fruits and vegetables daily.He consumes 0 sweetened beverage(s) daily.He exercises with enough effort to increase his heart rate 10 to 19 minutes per day.  He exercises with enough effort to " "increase his heart rate 3 or less days per week. He is missing 7 dose(s) of medications per week.     Concern - fall on ice yesterday - DOI = 2/15/2022 at 9:15 am, [place] [employer] [facility] [place] - feet went out from him, landed on upper back, right elbow, no head trauma, no LOC, felt cracking in upper back, noting stiffness in neck/upper back, decreased ROM, muscular tightness, no radiation, increased soreness, no incontinence, no numbness, no tingling, normal strength - notes rt elbow bruising only, no trauma    Onset: neck and upper back pain   Description: sore neck and back pain   Intensity: mild, moderate  Progression of Symptoms:  worsening  Accompanying Signs & Symptoms: loss of range of motion   Previous history of similar problem: no   Precipitating factors:        Worsened by: turn head to left   Alleviating factors:        Improved by: no   Therapies tried and outcome: None    Review of Systems   CONSTITUTIONAL: NEGATIVE for fever, chills, change in weight  INTEGUMENTARY/SKIN: NEGATIVE for worrisome rashes, moles or lesions  EYES: NEGATIVE for vision changes or irritation  ENT/MOUTH: NEGATIVE for ear, mouth and throat problems  RESP: NEGATIVE for significant cough or SOB  CV: NEGATIVE for chest pain, palpitations or peripheral edema  GI: NEGATIVE for nausea, abdominal pain, heartburn, or change in bowel habits  : NEGATIVE for frequency, dysuria, or hematuria  MUSCULOSKELETAL: NEGATIVE for significant arthralgias or myalgia  NEURO: NEGATIVE for weakness, dizziness or paresthesias  ENDOCRINE: NEGATIVE for temperature intolerance, skin/hair changes  HEME: NEGATIVE for bleeding problems  PSYCHIATRIC: NEGATIVE for changes in mood or affect      Objective    /72   Pulse 101   Temp 97.1  F (36.2  C) (Tympanic)   Resp 16   Ht 1.88 m (6' 2\")   Wt 102.1 kg (225 lb)   SpO2 98%   BMI 28.89 kg/m    Body mass index is 28.89 kg/m .  Physical Exam   GENERAL: healthy, alert and no distress  EYES: Eyes " grossly normal to inspection, PERRL and conjunctivae and sclerae normal  HENT: ear canals and TM's normal, nose and mouth without ulcers or lesions  NECK: no adenopathy, no asymmetry, masses, or scars and thyroid normal to palpation  RESP: lungs clear to auscultation - no rales, rhonchi or wheezes  CV: regular rate and rhythm, normal S1 S2, no S3 or S4, no murmur, click or rub, no peripheral edema and peripheral pulses strong  ABDOMEN: soft, nontender, no hepatosplenomegaly, no masses and bowel sounds normal  MS: no gross musculoskeletal defects noted, no edema  SKIN: no suspicious lesions or rashes  NEURO: Normal strength and tone, mentation intact and speech normal  PSYCH: mentation appears normal, affect normal/bright

## 2022-02-17 ASSESSMENT — ANXIETY QUESTIONNAIRES: GAD7 TOTAL SCORE: 0

## 2022-04-27 DIAGNOSIS — F41.0 PANIC ATTACK: ICD-10-CM

## 2022-04-27 DIAGNOSIS — K21.9 GASTROESOPHAGEAL REFLUX DISEASE WITHOUT ESOPHAGITIS: ICD-10-CM

## 2022-04-27 DIAGNOSIS — F41.9 ANXIETY: ICD-10-CM

## 2022-04-27 DIAGNOSIS — Z91.09 ENVIRONMENTAL ALLERGIES: ICD-10-CM

## 2022-04-27 NOTE — LETTER
[facility] [address]  [place] [facility] [address] [phone]       May 10, 2022    Kashif SPENCER Tamara  [address]  [place] [facility] [address]    To Kashif:     We have been calling you regarding a recent refill request we received for Fluticasone, Omeprazole and Sertraline  .  Unfortunately, we were unable to reach you.  We are notifying you that you are due for physical with fasting labs after 6/14/22 prior to your next refill.  You can schedule this appointment via [employer] or by calling the clinic at [phone] Option 1.        Sincerely,        Hong Leon M.D./crc

## 2022-04-29 RX ORDER — OMEPRAZOLE 40 MG/1
CAPSULE, DELAYED RELEASE ORAL
Qty: 90 CAPSULE | Refills: 0 | Status: SHIPPED | OUTPATIENT
Start: 2022-04-29 | End: 2022-07-19

## 2022-04-29 RX ORDER — FLUTICASONE PROPIONATE 50 MCG
SPRAY, SUSPENSION (ML) NASAL
Qty: 48 G | Refills: 3 | Status: SHIPPED | OUTPATIENT
Start: 2022-04-29 | End: 2022-12-19

## 2022-04-29 NOTE — TELEPHONE ENCOUNTER
Routing refill request to provider for review/approval because:  Drug/Flonase needs updated dispense and refill amount      Refill approved per [facility] refill protocol.  Zoloft and Priolsec Refill approved/passed per [facility] refill protocol.    Thank you!  Mariah CRAMER RN   [facility] Triage

## 2022-05-02 NOTE — TELEPHONE ENCOUNTER
Attempt #1, mychart message sent. Preventative with fasting labs after 6/14/22.    Harris Marcano

## 2022-07-31 ENCOUNTER — HEALTH MAINTENANCE LETTER (OUTPATIENT)
Age: 62
End: 2022-07-31

## 2022-09-19 ENCOUNTER — TELEPHONE (OUTPATIENT)
Dept: FAMILY MEDICINE | Facility: CLINIC | Age: 62
End: 2022-09-19

## 2022-10-16 ENCOUNTER — HEALTH MAINTENANCE LETTER (OUTPATIENT)
Age: 62
End: 2022-10-16

## 2022-12-15 ASSESSMENT — ENCOUNTER SYMPTOMS
HEADACHES: 1
CHILLS: 0
ABDOMINAL PAIN: 0
WEAKNESS: 0
FEVER: 0
PALPITATIONS: 0
ARTHRALGIAS: 0
HEARTBURN: 0
MYALGIAS: 0
SORE THROAT: 0
DIARRHEA: 0
CONSTIPATION: 0
PARESTHESIAS: 0
JOINT SWELLING: 0
HEMATURIA: 0
NERVOUS/ANXIOUS: 0
DIZZINESS: 0
FREQUENCY: 0
HEMATOCHEZIA: 0
SHORTNESS OF BREATH: 0
COUGH: 1
EYE PAIN: 0
DYSURIA: 0
NAUSEA: 0

## 2022-12-19 ENCOUNTER — OFFICE VISIT (OUTPATIENT)
Dept: FAMILY MEDICINE | Facility: CLINIC | Age: 62
End: 2022-12-19
Payer: COMMERCIAL

## 2022-12-19 VITALS
WEIGHT: 230 LBS | DIASTOLIC BLOOD PRESSURE: 82 MMHG | BODY MASS INDEX: 29.52 KG/M2 | HEIGHT: 74 IN | RESPIRATION RATE: 14 BRPM | TEMPERATURE: 97.6 F | OXYGEN SATURATION: 100 % | HEART RATE: 67 BPM | SYSTOLIC BLOOD PRESSURE: 118 MMHG

## 2022-12-19 DIAGNOSIS — K63.5 POLYP OF COLON, UNSPECIFIED PART OF COLON, UNSPECIFIED TYPE: ICD-10-CM

## 2022-12-19 DIAGNOSIS — Z51.81 MEDICATION MONITORING ENCOUNTER: ICD-10-CM

## 2022-12-19 DIAGNOSIS — Z00.00 ROUTINE GENERAL MEDICAL EXAMINATION AT A HEALTH CARE FACILITY: Primary | ICD-10-CM

## 2022-12-19 DIAGNOSIS — Z12.11 SCREEN FOR COLON CANCER: ICD-10-CM

## 2022-12-19 DIAGNOSIS — E78.5 HYPERLIPIDEMIA LDL GOAL <100: ICD-10-CM

## 2022-12-19 DIAGNOSIS — Z12.5 SCREENING FOR PROSTATE CANCER: ICD-10-CM

## 2022-12-19 DIAGNOSIS — F41.0 PANIC ATTACK: ICD-10-CM

## 2022-12-19 DIAGNOSIS — K21.9 GASTROESOPHAGEAL REFLUX DISEASE WITHOUT ESOPHAGITIS: ICD-10-CM

## 2022-12-19 DIAGNOSIS — F41.9 ANXIETY: ICD-10-CM

## 2022-12-19 DIAGNOSIS — I77.810 AORTIC ROOT DILATATION (H): ICD-10-CM

## 2022-12-19 DIAGNOSIS — Z91.09 ENVIRONMENTAL ALLERGIES: ICD-10-CM

## 2022-12-19 DIAGNOSIS — R73.03 PREDIABETES: ICD-10-CM

## 2022-12-19 LAB
ALBUMIN SERPL BCG-MCNC: 4.3 G/DL (ref 3.5–5.2)
ALBUMIN UR-MCNC: NEGATIVE MG/DL
ALP SERPL-CCNC: 61 U/L (ref 40–129)
ALT SERPL W P-5'-P-CCNC: 37 U/L (ref 10–50)
ANION GAP SERPL CALCULATED.3IONS-SCNC: 15 MMOL/L (ref 7–15)
APPEARANCE UR: CLEAR
AST SERPL W P-5'-P-CCNC: 31 U/L (ref 10–50)
BILIRUB SERPL-MCNC: 0.2 MG/DL
BILIRUB UR QL STRIP: NEGATIVE
BUN SERPL-MCNC: 23.6 MG/DL (ref 8–23)
CALCIUM SERPL-MCNC: 9.8 MG/DL (ref 8.8–10.2)
CHLORIDE SERPL-SCNC: 107 MMOL/L (ref 98–107)
CHOLEST SERPL-MCNC: 205 MG/DL
CK SERPL-CCNC: 336 U/L (ref 39–308)
COLOR UR AUTO: YELLOW
CREAT SERPL-MCNC: 0.94 MG/DL (ref 0.67–1.17)
CREAT UR-MCNC: 36.3 MG/DL
DEPRECATED HCO3 PLAS-SCNC: 20 MMOL/L (ref 22–29)
ERYTHROCYTE [DISTWIDTH] IN BLOOD BY AUTOMATED COUNT: 13.3 % (ref 10–15)
GFR SERPL CREATININE-BSD FRML MDRD: >90 ML/MIN/1.73M2
GLUCOSE SERPL-MCNC: 76 MG/DL (ref 70–99)
GLUCOSE UR STRIP-MCNC: NEGATIVE MG/DL
HBA1C MFR BLD: 5.7 % (ref 0–5.6)
HCT VFR BLD AUTO: 45.5 % (ref 40–53)
HDLC SERPL-MCNC: 56 MG/DL
HGB BLD-MCNC: 15.3 G/DL (ref 13.3–17.7)
HGB UR QL STRIP: NEGATIVE
KETONES UR STRIP-MCNC: NEGATIVE MG/DL
LDLC SERPL CALC-MCNC: 132 MG/DL
LEUKOCYTE ESTERASE UR QL STRIP: NEGATIVE
MCH RBC QN AUTO: 29.1 PG (ref 26.5–33)
MCHC RBC AUTO-ENTMCNC: 33.6 G/DL (ref 31.5–36.5)
MCV RBC AUTO: 87 FL (ref 78–100)
MICROALBUMIN UR-MCNC: <12 MG/L
MICROALBUMIN/CREAT UR: NORMAL MG/G{CREAT}
NITRATE UR QL: NEGATIVE
NONHDLC SERPL-MCNC: 149 MG/DL
PH UR STRIP: 5.5 [PH] (ref 5–7)
PLATELET # BLD AUTO: 276 10E3/UL (ref 150–450)
POTASSIUM SERPL-SCNC: 5 MMOL/L (ref 3.4–5.3)
PROT SERPL-MCNC: 7.2 G/DL (ref 6.4–8.3)
PSA SERPL-MCNC: 0.69 NG/ML (ref 0–4.5)
RBC # BLD AUTO: 5.26 10E6/UL (ref 4.4–5.9)
SODIUM SERPL-SCNC: 142 MMOL/L (ref 136–145)
SP GR UR STRIP: 1.01 (ref 1–1.03)
TRIGL SERPL-MCNC: 83 MG/DL
TSH SERPL DL<=0.005 MIU/L-ACNC: 1.74 UIU/ML (ref 0.3–4.2)
UROBILINOGEN UR STRIP-ACNC: 0.2 E.U./DL
WBC # BLD AUTO: 8.6 10E3/UL (ref 4–11)

## 2022-12-19 PROCEDURE — 83036 HEMOGLOBIN GLYCOSYLATED A1C: CPT | Performed by: FAMILY MEDICINE

## 2022-12-19 PROCEDURE — 80061 LIPID PANEL: CPT | Performed by: FAMILY MEDICINE

## 2022-12-19 PROCEDURE — 82550 ASSAY OF CK (CPK): CPT | Performed by: FAMILY MEDICINE

## 2022-12-19 PROCEDURE — 36415 COLL VENOUS BLD VENIPUNCTURE: CPT | Performed by: FAMILY MEDICINE

## 2022-12-19 PROCEDURE — 84443 ASSAY THYROID STIM HORMONE: CPT | Performed by: FAMILY MEDICINE

## 2022-12-19 PROCEDURE — 80053 COMPREHEN METABOLIC PANEL: CPT | Performed by: FAMILY MEDICINE

## 2022-12-19 PROCEDURE — G0103 PSA SCREENING: HCPCS | Performed by: FAMILY MEDICINE

## 2022-12-19 PROCEDURE — 81003 URINALYSIS AUTO W/O SCOPE: CPT | Performed by: FAMILY MEDICINE

## 2022-12-19 PROCEDURE — 99396 PREV VISIT EST AGE 40-64: CPT | Performed by: FAMILY MEDICINE

## 2022-12-19 PROCEDURE — 85027 COMPLETE CBC AUTOMATED: CPT | Performed by: FAMILY MEDICINE

## 2022-12-19 PROCEDURE — 82043 UR ALBUMIN QUANTITATIVE: CPT | Performed by: FAMILY MEDICINE

## 2022-12-19 PROCEDURE — 99214 OFFICE O/P EST MOD 30 MIN: CPT | Mod: 25 | Performed by: FAMILY MEDICINE

## 2022-12-19 RX ORDER — FLUTICASONE PROPIONATE 50 MCG
2 SPRAY, SUSPENSION (ML) NASAL DAILY
Qty: 48 G | Refills: 3 | Status: SHIPPED | OUTPATIENT
Start: 2022-12-19 | End: 2023-11-06

## 2022-12-19 ASSESSMENT — ENCOUNTER SYMPTOMS
HEMATURIA: 0
DIZZINESS: 0
ARTHRALGIAS: 0
JOINT SWELLING: 0
NAUSEA: 0
COUGH: 1
WEAKNESS: 0
EYE PAIN: 0
DYSURIA: 0
HEARTBURN: 0
FEVER: 0
SHORTNESS OF BREATH: 0
CHILLS: 0
CONSTIPATION: 0
MYALGIAS: 0
NERVOUS/ANXIOUS: 0
SORE THROAT: 0
DIARRHEA: 0
HEADACHES: 1
PARESTHESIAS: 0
PALPITATIONS: 0
ABDOMINAL PAIN: 0
HEMATOCHEZIA: 0
FREQUENCY: 0

## 2022-12-19 ASSESSMENT — ANXIETY QUESTIONNAIRES
GAD7 TOTAL SCORE: 2
GAD7 TOTAL SCORE: 2
3. WORRYING TOO MUCH ABOUT DIFFERENT THINGS: NOT AT ALL
6. BECOMING EASILY ANNOYED OR IRRITABLE: SEVERAL DAYS
2. NOT BEING ABLE TO STOP OR CONTROL WORRYING: NOT AT ALL
1. FEELING NERVOUS, ANXIOUS, OR ON EDGE: SEVERAL DAYS
IF YOU CHECKED OFF ANY PROBLEMS ON THIS QUESTIONNAIRE, HOW DIFFICULT HAVE THESE PROBLEMS MADE IT FOR YOU TO DO YOUR WORK, TAKE CARE OF THINGS AT HOME, OR GET ALONG WITH OTHER PEOPLE: SOMEWHAT DIFFICULT
7. FEELING AFRAID AS IF SOMETHING AWFUL MIGHT HAPPEN: NOT AT ALL
5. BEING SO RESTLESS THAT IT IS HARD TO SIT STILL: NOT AT ALL

## 2022-12-19 ASSESSMENT — PATIENT HEALTH QUESTIONNAIRE - PHQ9
SUM OF ALL RESPONSES TO PHQ QUESTIONS 1-9: 0
5. POOR APPETITE OR OVEREATING: NOT AT ALL

## 2022-12-19 ASSESSMENT — PAIN SCALES - GENERAL: PAINLEVEL: EXTREME PAIN (9)

## 2022-12-19 NOTE — PROGRESS NOTES
[facility]  [place]    SUBJECTIVE    CC: Kashif is an 62 year old who presents for preventative health visit.     Patient has been advised of split billing requirements and indicates understanding: Yes     Healthy Habits:     Getting at least 3 servings of Calcium per day:  Yes    Bi-annual eye exam:  Yes    Dental care twice a year:  Yes    Sleep apnea or symptoms of sleep apnea:  None    Diet:  Other    Frequency of exercise:  None    Taking medications regularly:  Yes    Medication side effects:  Not applicable    PHQ-2 Total Score: 0    Additional concerns today:  Yes    Recent cough (improved), which threw back/rib/neck/rt arm out, unable to sleep has tried tylenol, heat, ice and ibuprofen with no relief - appt soon with Dr Saunders    Aortic root dilatation - followed by cardiology    Prediabetes    Lab Results   Component Value Date    A1C 5.7 12/19/2022    A1C 5.7 06/14/2021    A1C 5.7 01/24/2020     Lipids    Recent Labs   Lab Test 06/14/21  0745 01/09/20  0753   CHOL 182 207*   HDL 42 60   * 129*   TRIG 136 88     GERD - no issues    Anxiety Follow-Up - PHQ = 0 and KAYLEY = 3    How are you doing with your anxiety since your last visit? Worsened recently , being more snippy with people      Have you had a significant life event? Job Concerns Extra job     Are you feeling depressed? No    Do you have any concerns with your use of alcohol or other drugs? No    KAYLEY-7 SCORE 3/24/2021 6/14/2021 2/16/2022   Total Score - 0 (minimal anxiety) -   Total Score 2 0 0     PHQ 3/24/2021 6/14/2021 2/16/2022   PHQ-9 Total Score 0 0 0   Q9: Thoughts of better off dead/self-harm past 2 weeks Not at all Not at all Not at all     Today's PHQ-2 Score:   PHQ-2 ( 1999 Pfizer) 12/15/2022   Q1: Little interest or pleasure in doing things 0   Q2: Feeling down, depressed or hopeless 0   PHQ-2 Score 0   PHQ-2 Total Score (12-17 Years)- Positive if 3 or more points; Administer PHQ-A if positive -   Q1: Little interest or  pleasure in doing things Not at all   Q2: Feeling down, depressed or hopeless Not at all   PHQ-2 Score 0           Social History     Tobacco Use     Smoking status: Former     Packs/day: 0.25     Years: 25.00     Pack years: 6.25     Types: Cigarettes     Quit date: 2005     Years since quittin.4     Smokeless tobacco: Never   Substance Use Topics     Alcohol use: Not Currently     Alcohol/week: 0.0 - 0.8 standard drinks     Comment: 0-3 drinks a month     If you drink alcohol do you typically have >3 drinks per day or >7 drinks per week? No    Alcohol Use 2022   Prescreen: >3 drinks/day or >7 drinks/week? -   Prescreen: >3 drinks/day or >7 drinks/week? No       Last PSA:   PSA   Date Value Ref Range Status   2021 0.61 0 - 4 ug/L Final     Comment:     Assay Method:  Chemiluminescence using Siemens Vista analyzer       Reviewed orders with patient. Reviewed health maintenance and updated orders accordingly - Yes    Reviewed and updated as needed this visit by clinical staff   Tobacco  Allergies  Meds              Reviewed and updated as needed this visit by Provider                   Review of Systems   Constitutional: Negative for chills and fever.   HENT: Positive for congestion and hearing loss. Negative for ear pain and sore throat.    Eyes: Negative for pain and visual disturbance.   Respiratory: Positive for cough. Negative for shortness of breath.    Cardiovascular: Negative for chest pain, palpitations and peripheral edema.   Gastrointestinal: Negative for abdominal pain, constipation, diarrhea, heartburn, hematochezia and nausea.   Genitourinary: Negative for dysuria, frequency, genital sores, hematuria, impotence, penile discharge and urgency.   Musculoskeletal: Negative for arthralgias, joint swelling and myalgias.   Skin: Negative for rash.   Neurological: Positive for headaches. Negative for dizziness, weakness and paresthesias.   Psychiatric/Behavioral: Negative for mood  changes. The patient is not nervous/anxious.      Health Maintenance     Colonoscopy:  Due 2/2025   FIT:  given              PSA:  pending   DEXA:  NA    Health Maintenance Due   Topic Date Due     COVID-19 Vaccine (4 - Booster for Moderna series) 01/17/2022     YEARLY PREVENTIVE VISIT  06/14/2022     LIPID  06/14/2022     PSA  06/14/2022     INFLUENZA VACCINE (1) 09/01/2022       Current Problem List    Patient Active Problem List   Diagnosis     Seasonal allergies     GERD (gastroesophageal reflux disease)     Hyperlipidemia LDL goal <100     Colon polyps     Advanced directives, counseling/discussion     Environmental allergies     Aortic root dilatation (H)     Class 1 obesity with serious comorbidity and body mass index (BMI) of 31.0 to 31.9 in adult, unspecified obesity type     Anxiety     Panic attack     Prediabetes       Past Medical History    Past Medical History:   Diagnosis Date     Anxiety 04/20/2020     Aortic root dilatation (H) 08/2019    mild, 4.1 cm     Arthritis 2015    Hands and wrists     Colocutaneous fistula      Colon polyps 01/2012    adenomas x 3     GERD (gastroesophageal reflux disease)      Hyperlipidemia LDL goal <100 10/31/2010     Other specified disorders of eye and adnexa 1971    choroidal rupture - dr reid     Panic attack 04/20/2020     Prediabetes 04/20/2020     Seasonal allergies     spring     Unspecified disorder of thyroid 2005    multinodular goiter - dr herrera/andrisevic       Past Surgical History    Past Surgical History:   Procedure Laterality Date     BIOPSY  2005    goiter - Dr Herrera     COLONOSCOPY  01/2012    adenomatous polyps - due 5 yrs     COLONOSCOPY N/A 2/4/2020    diverticulosis - due 5 yrs     STRESS ECHO (METRO)  07/2019    normal     SURGICAL HISTORY OF -  Right 1997    RT SHOULDER REPAIR     SURGICAL HISTORY OF -   1981    WISDOM TEETH       Current Medications    Current Outpatient Medications   Medication Sig Dispense Refill     fluticasone  (FLONASE) 50 MCG/ACT nasal spray Spray 2 sprays into both nostrils daily 48 g 3     multivitamin, therapeutic (THERA-VIT) TABS tablet Take 1 tablet by mouth daily       sertraline (ZOLOFT) 50 MG tablet Take 1 tablet (50 mg) by mouth daily 90 tablet 3     omeprazole (PRILOSEC) 40 MG DR capsule TAKE ONE CAPSULE BY MOUTH DAILY 30-60 MINUTES BEFORE A MEAL (Patient not taking: Reported on 2022) 90 capsule 1       Allergies    No Known Allergies    Immunizations    Immunization History   Administered Date(s) Administered     COVID-19 Vaccine 18+ (Moderna) 2021, 2021, 2021     HepA-Adult 2020, 2021     HepB 10/18/1995, 1995, 1996     Influenza (H1N1) 2009     Influenza (IIV3) PF 10/14/1999, 2007, 2008, 2009, 2013, 2013     Influenza Vaccine 50-64 or 18-64 w/egg allergy (Flublok) 2019     Influenza Vaccine, 6+MO IM (QUADRIVALENT W/PRESERVATIVES) 2014     TD (ADULT, 7+) 1997, 2004     Tdap (Adacel,Boostrix) 2012, 2021     Zoster vaccine recombinant adjuvanted (SHINGRIX) 2020, 2020       Family History    Family History   Problem Relation Age of Onset     Heart Disease Mother         Bicuspid     Cancer Father         age 56 - lymphoma     Colon Cancer No family hx of        Social History    Social History     Socioeconomic History     Marital status:      Spouse name: Esther     Number of children: 2     Years of education: 14     Highest education level: Not on file   Occupational History     Employer: [email or web address]   Tobacco Use     Smoking status: Former     Packs/day: 0.25     Years: 25.00     Pack years: 6.25     Types: Cigarettes     Quit date: 2005     Years since quittin.4     Smokeless tobacco: Never   Vaping Use     Vaping Use: Never used   Substance and Sexual Activity     Alcohol use: Not Currently     Alcohol/week: 0.0 - 0.8 standard drinks     Comment: 0-3 drinks a month  "    Drug use: No     Sexual activity: Yes     Partners: Female     Birth control/protection: Condom   Other Topics Concern      Service Not Asked     Blood Transfusions Not Asked     Caffeine Concern Yes     Comment: 2 cups qd     Occupational Exposure Not Asked     Hobby Hazards Not Asked     Sleep Concern Not Asked     Stress Concern Not Asked     Weight Concern Not Asked     Special Diet Not Asked     Back Care Not Asked     Exercise Yes     Comment: work, limited by knees     Bike Helmet Not Asked     Seat Belt Yes     Self-Exams Not Asked     Parent/sibling w/ CABG, MI or angioplasty before 65F 55M? No   Social History Narrative     Not on file     Social Determinants of Health     Financial Resource Strain: Not on file   Food Insecurity: Not on file   Transportation Needs: Not on file   Physical Activity: Not on file   Stress: Not on file   Social Connections: Not on file   Intimate Partner Violence: Not on file   Housing Stability: Not on file       ROS    CONSTITUTIONAL: NEGATIVE for fever, chills, change in weight  INTEGUMENTARY/SKIN: NEGATIVE for worrisome rashes, moles or lesions  EYES: NEGATIVE for vision changes or irritation  ENT/MOUTH: NEGATIVE for ear, mouth and throat problems  RESP: NEGATIVE for significant cough or SOB  BREAST: NEGATIVE for masses, tenderness or discharge  CV: NEGATIVE for chest pain, palpitations or peripheral edema  GI: NEGATIVE for nausea, abdominal pain, heartburn, or change in bowel habits  : NEGATIVE for frequency, dysuria, or hematuria  MUSCULOSKELETAL: NEGATIVE for significant arthralgias or myalgia  NEURO: NEGATIVE for weakness, dizziness or paresthesias  ENDOCRINE: NEGATIVE for temperature intolerance, skin/hair changes  HEME: NEGATIVE for bleeding problems  PSYCHIATRIC: NEGATIVE for changes in mood or affect    OBJECTIVE    /82   Pulse 67   Temp 97.6  F (36.4  C) (Tympanic)   Resp 14   Ht 1.88 m (6' 2\")   Wt 104.3 kg (230 lb)   SpO2 100%   BMI " 29.53 kg/m      EXAM:    GENERAL: healthy, alert and no distress  EYES: Eyes grossly normal to inspection, PERRL and conjunctivae and sclerae normal  HENT: ear canals and TM's normal, nose and mouth without ulcers or lesions  NECK: no adenopathy, no asymmetry, masses, or scars and thyroid normal to palpation  RESP: lungs clear to auscultation - no rales, rhonchi or wheezes  CV: regular rate and rhythm, normal S1 S2, no S3 or S4, no murmur, click or rub, no peripheral edema and peripheral pulses strong  ABDOMEN: soft, nontender, no hepatosplenomegaly, no masses and bowel sounds normal   (male): pt declines  RECTAL: pt declines  MS: no gross musculoskeletal defects noted, no edema  SKIN: no suspicious lesions or rashes  NEURO: Normal strength and tone, mentation intact and speech normal  PSYCH: mentation appears normal, affect normal/bright  LYMPH: no cervical, supraclavicular, axillary, or inguinal adenopathy    DIAGNOSTICS/PROCEDURES    Pending    ASSESSMENT      ICD-10-CM    1. Routine general medical examination at a health care facility  Z00.00 Comprehensive metabolic panel     Lipid panel reflex to direct LDL Fasting     CBC with platelets     CK total     UA reflex to Microscopic and Culture     Albumin Random Urine Quantitative with Creat Ratio     TSH with free T4 reflex     Prostate Specific Antigen Screen     Fecal colorectal cancer screen FIT     Hemoglobin A1c     REVIEW OF HEALTH MAINTENANCE PROTOCOL ORDERS     Comprehensive metabolic panel     Lipid panel reflex to direct LDL Fasting     CBC with platelets     CK total     UA reflex to Microscopic and Culture     Albumin Random Urine Quantitative with Creat Ratio     TSH with free T4 reflex     Prostate Specific Antigen Screen     Hemoglobin A1c     Fecal colorectal cancer screen FIT      2. Prediabetes  R73.03 Comprehensive metabolic panel     Lipid panel reflex to direct LDL Fasting     UA reflex to Microscopic and Culture     Albumin Random Urine  Quantitative with Creat Ratio     TSH with free T4 reflex     Hemoglobin A1c     REVIEW OF HEALTH MAINTENANCE PROTOCOL ORDERS     Comprehensive metabolic panel     Lipid panel reflex to direct LDL Fasting     UA reflex to Microscopic and Culture     Albumin Random Urine Quantitative with Creat Ratio     TSH with free T4 reflex     Hemoglobin A1c      3. Aortic root dilatation (H)  I77.810 REVIEW OF HEALTH MAINTENANCE PROTOCOL ORDERS      4. Hyperlipidemia LDL goal <100  E78.5 Comprehensive metabolic panel     Lipid panel reflex to direct LDL Fasting     CK total     REVIEW OF HEALTH MAINTENANCE PROTOCOL ORDERS     Comprehensive metabolic panel     Lipid panel reflex to direct LDL Fasting     CK total      5. Anxiety  F41.9 TSH with free T4 reflex     REVIEW OF HEALTH MAINTENANCE PROTOCOL ORDERS     TSH with free T4 reflex     sertraline (ZOLOFT) 50 MG tablet      6. Panic attack  F41.0 TSH with free T4 reflex     REVIEW OF HEALTH MAINTENANCE PROTOCOL ORDERS     TSH with free T4 reflex     sertraline (ZOLOFT) 50 MG tablet      7. Environmental allergies  Z91.09 REVIEW OF HEALTH MAINTENANCE PROTOCOL ORDERS     fluticasone (FLONASE) 50 MCG/ACT nasal spray      8. Gastroesophageal reflux disease without esophagitis  K21.9 CBC with platelets     REVIEW OF HEALTH MAINTENANCE PROTOCOL ORDERS     CBC with platelets      9. Polyp of colon, unspecified part of colon, unspecified type  K63.5 Fecal colorectal cancer screen FIT     REVIEW OF HEALTH MAINTENANCE PROTOCOL ORDERS     Fecal colorectal cancer screen FIT      10. Screening for prostate cancer  Z12.5 Prostate Specific Antigen Screen     REVIEW OF HEALTH MAINTENANCE PROTOCOL ORDERS     Prostate Specific Antigen Screen      11. Screen for colon cancer  Z12.11 Fecal colorectal cancer screen FIT     REVIEW OF HEALTH MAINTENANCE PROTOCOL ORDERS     Fecal colorectal cancer screen FIT      12. Medication monitoring encounter  Z51.81 Comprehensive metabolic panel     Lipid  panel reflex to direct LDL Fasting     CBC with platelets     CK total     UA reflex to Microscopic and Culture     Albumin Random Urine Quantitative with Creat Ratio     TSH with free T4 reflex     Hemoglobin A1c     REVIEW OF HEALTH MAINTENANCE PROTOCOL ORDERS     Comprehensive metabolic panel     Lipid panel reflex to direct LDL Fasting     CBC with platelets     CK total     UA reflex to Microscopic and Culture     Albumin Random Urine Quantitative with Creat Ratio     TSH with free T4 reflex     Hemoglobin A1c          PLAN    Discussed treatment/modality options, including risk and benefits, he desires:    advised alcohol consumption 1oz per day or less, advised 1 multivitamin per day, advised calcium 7215-5018 mg/d and Vitamin D 800-1200 IU/d, advised dentist every 6 months, advised diet and exercise, advised opthalmologist every 1-2 years, advised self testicular exam q month, further health care maintenance, further lab(s), immunization(s), medication refill(s), KAYLEY 7, completed and reviewed, PHQ-9, Depression Action Plan, completed and reviewed and observation    Discussed controversies surrounding PSA. Specifically reviewed 2017 USPSTF findings recommending discussion of PSA testing for men ages 55-69.  Reviewed findings of the  Randomized Study of Screening for Prostate Cancer which showed a 30% reduction in advanced stage prostate cancer and a 20% reduction in death rate from prostate cancer in this age group. PSA-based screening may prevent up to 2 deaths and up to 3 cases of metastatic disease per 1,000 men screened over 13 years.    We've elected to do PSA this year after discussing these controversies.    All diagnosis above reviewed and noted above, otherwise stable.      See [facility] orders for further details.      1) med refills    2) labs pending    3) declines Covid bivalent, flu, pneumovax    Return for Complete Physical, Medication Recheck Visit, Follow Up Chronic.    Health  "Maintenance Due   Topic Date Due     COVID-19 Vaccine (4 - Booster for Moderna series) 01/17/2022     YEARLY PREVENTIVE VISIT  06/14/2022     LIPID  06/14/2022     PSA  06/14/2022     INFLUENZA VACCINE (1) 09/01/2022       COUNSELING    Reviewed preventive health counseling, as reflected in patient instructions    BP Readings from Last 1 Encounters:   12/19/22 118/82     Estimated body mass index is 29.53 kg/m  as calculated from the following:    Height as of this encounter: 1.88 m (6' 2\").    Weight as of this encounter: 104.3 kg (230 lb).           reports that he quit smoking about 17 years ago. His smoking use included cigarettes. He has a 6.25 pack-year smoking history. He has never used smokeless tobacco.      Counseling Resources:    ATP IV Guidelines  Pooled Cohorts Equation Calculator  FRAX Risk Assessment  ICSI Preventive Guidelines  Dietary Guidelines for Americans, 2010  USDA's MyPlate  ASA Prophylaxis  Lung CA Screening           Hong Leon MD, FAAFP     [facility]  [address] [place] [id number]  [email or web address]   Office: [phone]  Fax: [phone]  Pager: [phone]     "

## 2022-12-20 ENCOUNTER — TELEPHONE (OUTPATIENT)
Dept: FAMILY MEDICINE | Facility: CLINIC | Age: 62
End: 2022-12-20

## 2022-12-20 DIAGNOSIS — M62.838 MUSCLE SPASM: Primary | ICD-10-CM

## 2022-12-20 RX ORDER — CYCLOBENZAPRINE HCL 5 MG
5-10 TABLET ORAL 3 TIMES DAILY PRN
Qty: 30 TABLET | Refills: 1 | Status: SHIPPED | OUTPATIENT
Start: 2022-12-20 | End: 2023-03-09

## 2022-12-20 NOTE — TELEPHONE ENCOUNTER
Patient calls.     Last Thursday he had a coughing fit, and something in his neck popped. Now has pain starting in right armpit down length of forearm. Right pinky and ring finger feel numb. Pain is 10/10 at times, intermittent pain. Patient can't sleep in bed, or lay on his side. Has to sleep in chair.     Patient saw Dr. Cronin - [facility] chiropractor - was told probably need MRI.     Routing to provider to review and advise.     Perla Mcbride RN  [facility]

## 2022-12-20 NOTE — TELEPHONE ENCOUNTER
Attempt # 1    Called # [phone]     Left a non detailed VM to call back at [phone] and ask for any available Triage Nurse.    TARIQ GTZ RN on 12/20/2022 at 5:23 PM   [facility]

## 2022-12-20 NOTE — TELEPHONE ENCOUNTER
Patient is calling to ask if he can get a muscle relaxer for his back pain. He forgot to ask for this yesterday when he was in the office

## 2022-12-21 NOTE — TELEPHONE ENCOUNTER
Called #   Telephone Information:   Mobile [phone]     Advised pt on the information below     Patient stated an understanding and agreed with plan.      Emerita Bland RN, BSN  [facility]

## 2023-03-09 ENCOUNTER — OFFICE VISIT (OUTPATIENT)
Dept: FAMILY MEDICINE | Facility: CLINIC | Age: 63
End: 2023-03-09
Payer: COMMERCIAL

## 2023-03-09 VITALS
SYSTOLIC BLOOD PRESSURE: 135 MMHG | DIASTOLIC BLOOD PRESSURE: 89 MMHG | WEIGHT: 230 LBS | OXYGEN SATURATION: 99 % | HEART RATE: 78 BPM | HEIGHT: 73 IN | BODY MASS INDEX: 30.48 KG/M2 | TEMPERATURE: 98.2 F | RESPIRATION RATE: 10 BRPM

## 2023-03-09 DIAGNOSIS — R73.03 PREDIABETES: ICD-10-CM

## 2023-03-09 DIAGNOSIS — M54.12 CERVICAL RADICULAR PAIN: ICD-10-CM

## 2023-03-09 DIAGNOSIS — Z01.818 PREOP EXAMINATION: Primary | ICD-10-CM

## 2023-03-09 DIAGNOSIS — K21.9 GASTROESOPHAGEAL REFLUX DISEASE WITHOUT ESOPHAGITIS: ICD-10-CM

## 2023-03-09 DIAGNOSIS — E78.5 HYPERLIPIDEMIA LDL GOAL <100: ICD-10-CM

## 2023-03-09 LAB
ANION GAP SERPL CALCULATED.3IONS-SCNC: 11 MMOL/L (ref 7–15)
BUN SERPL-MCNC: 17.9 MG/DL (ref 8–23)
CALCIUM SERPL-MCNC: 9.6 MG/DL (ref 8.8–10.2)
CHLORIDE SERPL-SCNC: 105 MMOL/L (ref 98–107)
CREAT SERPL-MCNC: 0.99 MG/DL (ref 0.67–1.17)
DEPRECATED HCO3 PLAS-SCNC: 25 MMOL/L (ref 22–29)
ERYTHROCYTE [DISTWIDTH] IN BLOOD BY AUTOMATED COUNT: 13.9 % (ref 10–15)
GFR SERPL CREATININE-BSD FRML MDRD: 86 ML/MIN/1.73M2
GLUCOSE SERPL-MCNC: 98 MG/DL (ref 70–99)
HCT VFR BLD AUTO: 44.6 % (ref 40–53)
HGB BLD-MCNC: 14.8 G/DL (ref 13.3–17.7)
MCH RBC QN AUTO: 29.1 PG (ref 26.5–33)
MCHC RBC AUTO-ENTMCNC: 33.2 G/DL (ref 31.5–36.5)
MCV RBC AUTO: 88 FL (ref 78–100)
PLATELET # BLD AUTO: 270 10E3/UL (ref 150–450)
POTASSIUM SERPL-SCNC: 5 MMOL/L (ref 3.4–5.3)
RBC # BLD AUTO: 5.09 10E6/UL (ref 4.4–5.9)
SODIUM SERPL-SCNC: 141 MMOL/L (ref 136–145)
WBC # BLD AUTO: 6.5 10E3/UL (ref 4–11)

## 2023-03-09 PROCEDURE — 80048 BASIC METABOLIC PNL TOTAL CA: CPT | Performed by: NURSE PRACTITIONER

## 2023-03-09 PROCEDURE — 99214 OFFICE O/P EST MOD 30 MIN: CPT | Performed by: NURSE PRACTITIONER

## 2023-03-09 PROCEDURE — 36415 COLL VENOUS BLD VENIPUNCTURE: CPT | Performed by: NURSE PRACTITIONER

## 2023-03-09 PROCEDURE — 85027 COMPLETE CBC AUTOMATED: CPT | Performed by: NURSE PRACTITIONER

## 2023-03-09 PROCEDURE — 93000 ELECTROCARDIOGRAM COMPLETE: CPT | Performed by: NURSE PRACTITIONER

## 2023-03-09 ASSESSMENT — PATIENT HEALTH QUESTIONNAIRE - PHQ9
SUM OF ALL RESPONSES TO PHQ QUESTIONS 1-9: 0
10. IF YOU CHECKED OFF ANY PROBLEMS, HOW DIFFICULT HAVE THESE PROBLEMS MADE IT FOR YOU TO DO YOUR WORK, TAKE CARE OF THINGS AT HOME, OR GET ALONG WITH OTHER PEOPLE: NOT DIFFICULT AT ALL
SUM OF ALL RESPONSES TO PHQ QUESTIONS 1-9: 0

## 2023-03-09 ASSESSMENT — ANXIETY QUESTIONNAIRES
IF YOU CHECKED OFF ANY PROBLEMS ON THIS QUESTIONNAIRE, HOW DIFFICULT HAVE THESE PROBLEMS MADE IT FOR YOU TO DO YOUR WORK, TAKE CARE OF THINGS AT HOME, OR GET ALONG WITH OTHER PEOPLE: NOT DIFFICULT AT ALL
7. FEELING AFRAID AS IF SOMETHING AWFUL MIGHT HAPPEN: NOT AT ALL
GAD7 TOTAL SCORE: 0
4. TROUBLE RELAXING: NOT AT ALL
5. BEING SO RESTLESS THAT IT IS HARD TO SIT STILL: NOT AT ALL
6. BECOMING EASILY ANNOYED OR IRRITABLE: NOT AT ALL
8. IF YOU CHECKED OFF ANY PROBLEMS, HOW DIFFICULT HAVE THESE MADE IT FOR YOU TO DO YOUR WORK, TAKE CARE OF THINGS AT HOME, OR GET ALONG WITH OTHER PEOPLE?: NOT DIFFICULT AT ALL
1. FEELING NERVOUS, ANXIOUS, OR ON EDGE: NOT AT ALL
3. WORRYING TOO MUCH ABOUT DIFFERENT THINGS: NOT AT ALL
7. FEELING AFRAID AS IF SOMETHING AWFUL MIGHT HAPPEN: NOT AT ALL
GAD7 TOTAL SCORE: 0
2. NOT BEING ABLE TO STOP OR CONTROL WORRYING: NOT AT ALL
GAD7 TOTAL SCORE: 0

## 2023-03-09 NOTE — PROGRESS NOTES
[address] [address]  Phone: [phone]  Primary Provider: Hong Leon  Pre-op Performing Provider: HONEY AMATO    PREOPERATIVE EVALUATION:  Today's date: 3/9/2023    Kashif Mcdonald is a 62 year old male who presents for a preoperative evaluation.    Surgical Information:  Surgery/Procedure: Anterior cervical decompression fusion C7-T1-Iliac Crest bone graft harvest  Surgery Location: [facility]  Surgeon: Bill Willis MD  Surgery Date: 03/29/23  Time of Surgery: 12:00 pm  Where patient plans to recover: At home with family  Fax number for surgical facility: [phone] [facility]  [phone] please send to both.     Type of Anesthesia Anticipated: General    Assessment & Plan     The proposed surgical procedure is considered INTERMEDIATE risk.    Preop examination  - CBC with platelets  - Basic metabolic panel  (Ca, Cl, CO2, Creat, Gluc, K, Na, BUN)  - EKG 12-lead complete w/read - Clinics  - CBC with platelets  - Basic metabolic panel  (Ca, Cl, CO2, Creat, Gluc, K, Na, BUN)    Cervical radicular pain    Hyperlipidemia LDL goal <100    Prediabetes    Gastroesophageal reflux disease without esophagitis       Risks and Recommendations:  The patient has the following additional risks and recommendations for perioperative complications:   - No identified additional risk factors other than previously addressed    Medication Instructions:  Patient is to take all scheduled medications on the day of surgery    RECOMMENDATION:  APPROVAL GIVEN to proceed with proposed procedure pending review of diagnostic evaluation.          Honey Amato, XIMENA        Subjective     HPI related to upcoming procedure: losing nerve function in right hand, cervial radiculopathy. Will proceed with surgery above. Overnight stay at [facility].    Preop Questions 3/6/2023   1. Have you ever had a heart attack or stroke? No   2. Have you ever  had surgery on your heart or blood vessels, such as a stent placement, a coronary artery bypass, or surgery on an artery in your head, neck, heart, or legs? No   3. Do you have chest pain with activity? No   4. Do you have a history of  heart failure? No   5. Do you currently have a cold, bronchitis or symptoms of other infection? No   6. Do you have a cough, shortness of breath, or wheezing? No   7. Do you or anyone in your family have previous history of blood clots? No   8. Do you or does anyone in your family have a serious bleeding problem such as prolonged bleeding following surgeries or cuts? No   9. Have you ever had problems with anemia or been told to take iron pills? No   10. Have you had any abnormal blood loss such as black, tarry or bloody stools? No   11. Have you ever had a blood transfusion? No   12. Are you willing to have a blood transfusion if it is medically needed before, during, or after your surgery? Yes   13. Have you or any of your relatives ever had problems with anesthesia? No   14. Do you have sleep apnea, excessive snoring or daytime drowsiness? No   15. Do you have any artifical heart valves or other implanted medical devices like a pacemaker, defibrillator, or continuous glucose monitor? No   16. Do you have artificial joints? No   17. Are you allergic to latex? No       Health Care Directive:  Patient does not have a Health Care Directive or Living Will: Discussed advance care planning with patient; however, patient declined at this time.    Preoperative Review of :   reviewed - no record of controlled substances prescribed.      Status of Chronic Conditions:  HYPERLIPIDEMIA - Patient has a long history of significant Hyperlipidemia requiring medication for treatment with recent good control. Patient reports no problems or side effects with the medication.       Review of Systems  CONSTITUTIONAL: NEGATIVE for fever, chills, change in weight  INTEGUMENTARY/SKIN: NEGATIVE for  worrisome rashes, moles or lesions  EYES: NEGATIVE for vision changes or irritation  ENT/MOUTH: NEGATIVE for ear, mouth and throat problems  RESP: NEGATIVE for significant cough or SOB  CV: NEGATIVE for chest pain, palpitations or peripheral edema  GI: NEGATIVE for nausea, abdominal pain, heartburn, or change in bowel habits  : NEGATIVE for frequency, dysuria, or hematuria  MUSCULOSKELETAL: NEGATIVE for significant arthralgias or myalgia  NEURO: NEGATIVE for weakness, dizziness or paresthesias  ENDOCRINE: NEGATIVE for temperature intolerance, skin/hair changes  HEME: NEGATIVE for bleeding problems  PSYCHIATRIC: NEGATIVE for changes in mood or affect    Patient Active Problem List    Diagnosis Date Noted     Anxiety 04/20/2020     Priority: Medium     Panic attack 04/20/2020     Priority: Medium     Prediabetes 04/20/2020     Priority: Medium     Class 1 obesity with serious comorbidity and body mass index (BMI) of 31.0 to 31.9 in adult, unspecified obesity type 01/09/2020     Priority: Medium     Aortic root dilatation (H) 08/01/2019     Priority: Medium     Environmental allergies 09/26/2017     Priority: Medium     Advanced directives, counseling/discussion 03/31/2016     Priority: Medium     Advance Care Planning 3/31/2016: ACP Review of Chart / Resources Provided:  Reviewed chart for advance care plan.  Kashif PALMER Tamara has been provided information and resources to begin or update their advance care plan.  Added by Hong Norris               Colon polyps      Priority: Medium     adenomas x 3       Hyperlipidemia LDL goal <100 10/31/2010     Priority: Medium     Seasonal allergies      Priority: Medium     GERD (gastroesophageal reflux disease)      Priority: Medium      Past Medical History:   Diagnosis Date     Anxiety 04/20/2020     Aortic root dilatation (H) 08/2019    mild, 4.1 cm     Arthritis 2015    Hands and wrists     Colocutaneous fistula      Colon polyps 01/2012    adenomas x 3     GERD  "(gastroesophageal reflux disease)      Hyperlipidemia LDL goal <100 10/31/2010     Other specified disorders of eye and adnexa 1971    choroidal rupture - dr reid     Panic attack 2020     Prediabetes 2020     Seasonal allergies     spring     Unspecified disorder of thyroid     multinodular goiter - dr herrera/andrisevic     Past Surgical History:   Procedure Laterality Date     BIOPSY  2005    goiter - Dr Herrera     COLONOSCOPY  2012    adenomatous polyps - due 5 yrs     COLONOSCOPY N/A 2020    diverticulosis - due 5 yrs     STRESS ECHO (METRO)  2019    normal     SURGICAL HISTORY OF -  Right 1997    RT SHOULDER REPAIR     SURGICAL HISTORY OF -       WISDOM TEETH     Current Outpatient Medications   Medication Sig Dispense Refill     fluticasone (FLONASE) 50 MCG/ACT nasal spray Spray 2 sprays into both nostrils daily 48 g 3     multivitamin, therapeutic (THERA-VIT) TABS tablet Take 1 tablet by mouth daily       sertraline (ZOLOFT) 50 MG tablet Take 1 tablet (50 mg) by mouth daily 90 tablet 3       No Known Allergies     Social History     Tobacco Use     Smoking status: Former     Packs/day: 0.25     Years: 25.00     Pack years: 6.25     Types: Cigarettes     Quit date: 2005     Years since quittin.6     Smokeless tobacco: Never   Substance Use Topics     Alcohol use: Not Currently     Alcohol/week: 0.0 - 0.8 standard drinks     Comment: 0-3 drinks a month     Family History   Problem Relation Age of Onset     Heart Disease Mother         Bicuspid     Cancer Father         age 56 - lymphoma     Colon Cancer No family hx of      History   Drug Use No         Objective     /89   Pulse 78   Temp 98.2  F (36.8  C) (Tympanic)   Resp 10   Ht 1.854 m (6' 1\")   Wt 104.3 kg (230 lb)   SpO2 99%   BMI 30.34 kg/m      Physical Exam    GENERAL APPEARANCE: healthy, alert and no distress     EYES: EOMI,  PERRL     HENT: ear canals and TM's normal and nose and mouth without " ulcers or lesions     NECK: no adenopathy, no asymmetry, masses, or scars and thyroid normal to palpation     RESP: lungs clear to auscultation - no rales, rhonchi or wheezes     CV: regular rates and rhythm, normal S1 S2, no S3 or S4 and no murmur, click or rub     ABDOMEN:  soft, nontender, no HSM or masses and bowel sounds normal     MS: extremities normal- no gross deformities noted, no evidence of inflammation in joints, FROM in all extremities.     SKIN: no suspicious lesions or rashes     NEURO: Normal strength and tone, sensory exam grossly normal, mentation intact and speech normal     PSYCH: mentation appears normal. and affect normal/bright     LYMPHATICS: No cervical adenopathy    Recent Labs   Lab Test 12/19/22  0942 06/14/21  0745   HGB 15.3 15.5    258    139   POTASSIUM 5.0 4.9   CR 0.94 1.05   A1C 5.7* 5.7*        Diagnostics:  Recent Results (from the past 48 hour(s))   CBC with platelets    Collection Time: 03/09/23 10:55 AM   Result Value Ref Range    WBC Count 6.5 4.0 - 11.0 10e3/uL    RBC Count 5.09 4.40 - 5.90 10e6/uL    Hemoglobin 14.8 13.3 - 17.7 g/dL    Hematocrit 44.6 40.0 - 53.0 %    MCV 88 78 - 100 fL    MCH 29.1 26.5 - 33.0 pg    MCHC 33.2 31.5 - 36.5 g/dL    RDW 13.9 10.0 - 15.0 %    Platelet Count 270 150 - 450 10e3/uL   Basic metabolic panel  (Ca, Cl, CO2, Creat, Gluc, K, Na, BUN)    Collection Time: 03/09/23 10:55 AM   Result Value Ref Range    Sodium 141 136 - 145 mmol/L    Potassium 5.0 3.4 - 5.3 mmol/L    Chloride 105 98 - 107 mmol/L    Carbon Dioxide (CO2) 25 22 - 29 mmol/L    Anion Gap 11 7 - 15 mmol/L    Urea Nitrogen 17.9 8.0 - 23.0 mg/dL    Creatinine 0.99 0.67 - 1.17 mg/dL    Calcium 9.6 8.8 - 10.2 mg/dL    Glucose 98 70 - 99 mg/dL    GFR Estimate 86 >60 mL/min/1.73m2      EKG: appears normal, NSR, normal axis, normal intervals, no acute ST/T changes c/w ischemia, no LVH by voltage criteria, unchanged from previous tracings    Revised Cardiac Risk Index  (RCRI):  The patient has the following serious cardiovascular risks for perioperative complications:   - No serious cardiac risks = 0 points     RCRI Interpretation: 0 points: Class I (very low risk - 0.4% complication rate)           Signed Electronically by: Honey Amato CNP  Copy of this evaluation report is provided to requesting physician.

## 2023-08-26 ENCOUNTER — HEALTH MAINTENANCE LETTER (OUTPATIENT)
Age: 63
End: 2023-08-26

## 2023-11-03 DIAGNOSIS — Z91.09 ENVIRONMENTAL ALLERGIES: ICD-10-CM

## 2023-11-03 DIAGNOSIS — F41.0 PANIC ATTACK: ICD-10-CM

## 2023-11-03 DIAGNOSIS — F41.9 ANXIETY: ICD-10-CM

## 2023-11-06 RX ORDER — FLUTICASONE PROPIONATE 50 MCG
SPRAY, SUSPENSION (ML) NASAL
Qty: 48 ML | Refills: 0 | Status: SHIPPED | OUTPATIENT
Start: 2023-11-06 | End: 2024-01-31

## 2024-01-30 DIAGNOSIS — F41.0 PANIC ATTACK: ICD-10-CM

## 2024-01-30 DIAGNOSIS — F41.9 ANXIETY: ICD-10-CM

## 2024-01-30 DIAGNOSIS — Z91.09 ENVIRONMENTAL ALLERGIES: ICD-10-CM

## 2024-01-31 RX ORDER — FLUTICASONE PROPIONATE 50 MCG
SPRAY, SUSPENSION (ML) NASAL
Qty: 16 G | Refills: 0 | Status: SHIPPED | OUTPATIENT
Start: 2024-01-31 | End: 2024-03-19

## 2024-03-16 DIAGNOSIS — Z91.09 ENVIRONMENTAL ALLERGIES: ICD-10-CM

## 2024-03-16 NOTE — LETTER
March 27, 2024      Kashif Mcdonald  [address] [address] [address]        Dear Kashif,     We received a refill request from your pharmacy for your medication.  At this time the nurses were able to give you a torres refill, but you are due to be seen for a physical, medication review and fasting labs  before the next refill.  This appointment can be scheduled by calling [phone] or can be scheduled via [employer] as well.    Thank you for choosing [facility]      Sincerely,        Hong Leon M.D.    Honey Amato, [facility]

## 2024-03-19 RX ORDER — FLUTICASONE PROPIONATE 50 MCG
SPRAY, SUSPENSION (ML) NASAL
Qty: 16 ML | Refills: 0 | Status: SHIPPED | OUTPATIENT
Start: 2024-03-19 | End: 2024-04-22

## 2024-04-15 ENCOUNTER — HOSPITAL ENCOUNTER (EMERGENCY)
Facility: CLINIC | Age: 64
Discharge: HOME | End: 2024-04-15
Attending: PHYSICIAN ASSISTANT | Admitting: PHYSICIAN ASSISTANT
Payer: COMMERCIAL

## 2024-04-15 VITALS
WEIGHT: 248.68 LBS | HEIGHT: 73 IN | RESPIRATION RATE: 18 BRPM | SYSTOLIC BLOOD PRESSURE: 133 MMHG | DIASTOLIC BLOOD PRESSURE: 103 MMHG | HEART RATE: 90 BPM | OXYGEN SATURATION: 100 % | BODY MASS INDEX: 32.96 KG/M2 | TEMPERATURE: 98.6 F

## 2024-04-15 DIAGNOSIS — I82.411 ACUTE DEEP VEIN THROMBOSIS (DVT) OF FEMORAL VEIN OF RIGHT LOWER EXTREMITY (H): ICD-10-CM

## 2024-04-15 PROCEDURE — 250N000013 HC RX MED GY IP 250 OP 250 PS 637: Performed by: PHYSICIAN ASSISTANT

## 2024-04-15 PROCEDURE — 99283 EMERGENCY DEPT VISIT LOW MDM: CPT

## 2024-04-15 RX ADMIN — RIVAROXABAN 15 MG: 15 TABLET, FILM COATED ORAL at 11:08

## 2024-04-15 ASSESSMENT — ACTIVITIES OF DAILY LIVING (ADL)
ADLS_ACUITY_SCORE: 33
ADLS_ACUITY_SCORE: 33

## 2024-04-15 ASSESSMENT — COLUMBIA-SUICIDE SEVERITY RATING SCALE - C-SSRS
6. HAVE YOU EVER DONE ANYTHING, STARTED TO DO ANYTHING, OR PREPARED TO DO ANYTHING TO END YOUR LIFE?: NO
2. HAVE YOU ACTUALLY HAD ANY THOUGHTS OF KILLING YOURSELF IN THE PAST MONTH?: NO
1. IN THE PAST MONTH, HAVE YOU WISHED YOU WERE DEAD OR WISHED YOU COULD GO TO SLEEP AND NOT WAKE UP?: NO

## 2024-04-15 NOTE — ED PROVIDER NOTES
History     Chief Complaint:  Deep Vein Thrombosis       HPI   Kashif Mcdonald is a 63 year old male with history of DVT, hyperlipidemia, obesity, and prediabetes who presents with wife for evaluation of deep vein thrombosis. The patient states 10 days ago he had bicep tendon reattachment surgery. Later he developed right leg pain and swelling. He was seen at [facility] earlier today where they found DVT in the right leg. They referred the patient here for treatment. He notes getting spinal fusion surgery done on 3/31/23 and 10 days later finding a clot in his calf. He was put on Xarelto then. Denies shortness of breath or chest pain.  Accompanied by his wife      US Lower Extremity Venous Duplex Right Port  Order: [id number]  Impression    COMPARISON:  07/12/2023    CLINICAL HISTORY:  leg pain and swelling after surgery    FINDINGS: The venous system of the right lower extremity was visualized using color-flow Doppler technique. Incomplete compressibility of the femoral and popliteal veins compatible with DVT. There is also incompressibility of posterior tibial calf vein in the proximal and mid thigh as well as a gastrocnemius calf vein. No additional DVT within the visualized veins of the right lower extremity.    The great saphenous vein compresses normally.    BAKER'S CYST: No    IMPRESSION:  1. Positive for right lower extremity DVT as described.    Critical results called to SHERRIE IBARRA on 4/15/2024 9:38 AM.  Exam End: 04/15/24  9:22 AM    Specimen Collected: 04/15/24  8:41 AM Last Resulted: 04/15/24  9:38 AM   Received From: [facility]  Result Received: 04/15/24  9:51 AM       Independent Historian:   None - Patient Only    Review of External Notes:   Urgent care note 4/15/2024    Medications:    Flonase  Zoloft     Past Medical History:    Obesity  Hyperlipidemia  DVT  Colon polyps  Anxiety  Prediabetes  GERD  Cervical  radiculopathy    Past Surgical History:    Cervical spine fusion  Shoulder arthroscopy  "with acromial repair  Colonoscopy  [place] teeth extraction  Biopsy thyroid     Physical Exam   Patient Vitals for the past 24 hrs:   BP Temp Temp src Pulse Resp SpO2 Height Weight   04/15/24 1110 -- -- -- -- -- 100 % -- --   04/15/24 1109 (!) 133/103 -- -- 90 -- -- -- --   04/15/24 0958 128/88 98.6  F (37  C) Temporal 94 18 100 % 1.854 m (6' 1\") 112.8 kg (248 lb 10.9 oz)      Physical Exam  Vitals and nursing note reviewed.   Eyes:      General: No scleral icterus.     Conjunctiva/sclera: Conjunctivae normal.   Cardiovascular:      Rate and Rhythm: Normal rate and regular rhythm.      Heart sounds: Normal heart sounds. No murmur heard.     No friction rub. No gallop.   Pulmonary:      Effort: Pulmonary effort is normal. No respiratory distress.      Breath sounds: Normal breath sounds. No stridor. No wheezing, rhonchi or rales.   Musculoskeletal:      Right knee: No swelling or ecchymosis. Normal range of motion.      Right lower leg: Swelling and tenderness present.      Right ankle: No swelling. No tenderness. Normal range of motion. Normal pulse.      Right foot: Normal capillary refill. Normal pulse.   Neurological:      Mental Status: He is oriented to person, place, and time. Mental status is at baseline.   Psychiatric:         Mood and Affect: Mood normal.         Behavior: Behavior normal.         Thought Content: Thought content normal.           Emergency Department Course     Emergency Department Course & Assessments:    Interventions:  Medications   rivaroxaban ANTICOAGULANT (XARELTO) tablet 15 mg (15 mg Oral $Given 4/15/24 1108)        Assessments:  1047 I obtained history and performed physical exam as noted above.     Independent Interpretation (X-rays, CTs, rhythm strip):  None    Consultations/Discussion of Management or Tests:  None        Social Determinants of Health affecting care:   None    Disposition:  The patient was discharged.     Impression & Plan           Medical Decision Making:  This " is a 60-year-old male who presents with acute onset of right blood clot after recent surgery.  He had an outpatient ultrasound obtained at the urgent care.  They sent him here for further treatment.  Fortunately denies any chest pain or shortness of breath.  He is not tachycardic or hypoxic.  Patient will be started on Xarelto as he has had treatment for blood clot in the same leg in the past.  He will follow-up with hematology provider that he saw last time to further discuss any chronic need for anticoagulation.  Shared decision making regarding the type of anticoagulant was discussed and risks and benefits were also discussed.  At this time I feel the patient safe to discharge home.      Diagnosis:    ICD-10-CM    1. Acute deep vein thrombosis (DVT) of femoral vein of right lower extremity (H)  I82.411            Discharge Medications:  Discharge Medication List as of 4/15/2024 11:03 AM        START taking these medications    Details   Rivaroxaban ANTICOAGULANT 15 & 20 MG TBPK Starter Therapy Pack Take 15 mg by mouth 2 times daily (with meals) for 21 days, THEN 20 mg daily with food for 9 days., Disp-51 each, R-0, Local Print                Scribe Disclosure:  Joslyn LLAMAS, am serving as a scribe at 10:56 AM on 4/15/2024 to document services personally performed by Jesus Carrion PA-C based on my observations and the provider's statements to me.     4/15/2024   Jesus Carrion PA-C Kruger, Jacob C, PA-C  04/15/24 7156

## 2024-04-15 NOTE — ED TRIAGE NOTES
Should surgery end of march, right calf pain for the last few days. Had US at  and found tyraven have an arterial blood clot, referred to ER for treatment. Hx of blood clots  not currently on thinners. ABCS intact and Aox4.      Triage Assessment (Adult)       Row Name 04/15/24 0956          Triage Assessment    Airway WDL WDL        Respiratory WDL    Respiratory WDL WDL        Cardiac WDL    Cardiac WDL WDL

## 2024-04-20 DIAGNOSIS — Z91.09 ENVIRONMENTAL ALLERGIES: ICD-10-CM

## 2024-04-20 NOTE — LETTER
April 25, 2024      Kashif PALMER Tamara  [address] [facility] [address] [address]        We received a refill request from your pharmacy for your fluticasone (FLONASE) 50 MCG/ACT nasal spray medication.  At this time the nurses were able to give you a torres refill, but you are due to be seen for a fasting physical before the next refill.  This appointment can be scheduled by calling [phone] or can be scheduled via [employer] as well.    Thank you for choosing [facility]            Sincerely,            Hong Leon M.D.

## 2024-04-22 RX ORDER — FLUTICASONE PROPIONATE 50 MCG
SPRAY, SUSPENSION (ML) NASAL
Qty: 16 ML | Refills: 0 | Status: SHIPPED | OUTPATIENT
Start: 2024-04-22 | End: 2024-05-21

## 2024-04-23 NOTE — TELEPHONE ENCOUNTER
Left message to call back. When patient calls back please advise of Dr. Leon's message below. If patient is seeing new doctor - please update in chart and advise patient to send future refill request to new PCP.     MyChart message sent as well

## 2024-06-18 DIAGNOSIS — Z91.09 ENVIRONMENTAL ALLERGIES: ICD-10-CM

## 2024-06-19 RX ORDER — FLUTICASONE PROPIONATE 50 MCG
SPRAY, SUSPENSION (ML) NASAL
Qty: 16 ML | Refills: 0 | OUTPATIENT
Start: 2024-06-19

## 2024-06-20 NOTE — TELEPHONE ENCOUNTER
Due for cpx fasting, last 12/2022, rx declined OTC, appears to be now receiving care at [facility]

## 2024-10-19 ENCOUNTER — HEALTH MAINTENANCE LETTER (OUTPATIENT)
Age: 64
End: 2024-10-19

## (undated) DEVICE — KIT ENDO TURNOVER/PROCEDURE W/CLEAN A SCOPE LINERS 103888

## (undated) RX ORDER — FENTANYL CITRATE 50 UG/ML
INJECTION, SOLUTION INTRAMUSCULAR; INTRAVENOUS
Status: DISPENSED
Start: 2020-02-04